# Patient Record
Sex: FEMALE | Race: WHITE | NOT HISPANIC OR LATINO | Employment: UNEMPLOYED | URBAN - METROPOLITAN AREA
[De-identification: names, ages, dates, MRNs, and addresses within clinical notes are randomized per-mention and may not be internally consistent; named-entity substitution may affect disease eponyms.]

---

## 2017-05-16 ENCOUNTER — TELEPHONE (OUTPATIENT)
Dept: OBSTETRICS AND GYNECOLOGY | Facility: CLINIC | Age: 27
End: 2017-05-16

## 2017-05-16 NOTE — TELEPHONE ENCOUNTER
Patient stated her LMP 4/15/17. She stated she had a positive pregnancy test. I offered patient an appointment on 5/25 at 1:40. Patient scheduled appointment.

## 2017-05-16 NOTE — TELEPHONE ENCOUNTER
----- Message from Horace Andrea sent at 5/16/2017 12:56 PM CDT -----  Contact: ,samina  Patient need to schedule appointment for first time pregnancy please call back at  [phone]

## 2017-05-25 ENCOUNTER — OFFICE VISIT (OUTPATIENT)
Dept: OBSTETRICS AND GYNECOLOGY | Facility: CLINIC | Age: 27
End: 2017-05-25
Payer: COMMERCIAL

## 2017-05-25 ENCOUNTER — LAB VISIT (OUTPATIENT)
Dept: LAB | Facility: HOSPITAL | Age: 27
End: 2017-05-25
Attending: OBSTETRICS & GYNECOLOGY
Payer: COMMERCIAL

## 2017-05-25 VITALS
BODY MASS INDEX: 26.29 KG/M2 | DIASTOLIC BLOOD PRESSURE: 84 MMHG | HEART RATE: 93 BPM | WEIGHT: 154 LBS | SYSTOLIC BLOOD PRESSURE: 128 MMHG | HEIGHT: 64 IN

## 2017-05-25 DIAGNOSIS — Z32.01 POSITIVE PREGNANCY TEST: ICD-10-CM

## 2017-05-25 DIAGNOSIS — N91.2 ABSENT MENSES: Primary | ICD-10-CM

## 2017-05-25 LAB
ABO + RH BLD: NORMAL
B-HCG UR QL: POSITIVE
BASOPHILS # BLD AUTO: 0 K/UL
BASOPHILS NFR BLD: 0.2 %
BILIRUB SERPL-MCNC: NEGATIVE MG/DL
BLD GP AB SCN CELLS X3 SERPL QL: NORMAL
BLOOD URINE, POC: NEGATIVE
COLOR, POC UA: YELLOW
CTP QC/QA: YES
DIFFERENTIAL METHOD: ABNORMAL
EOSINOPHIL # BLD AUTO: 0.2 K/UL
EOSINOPHIL NFR BLD: 1.5 %
ERYTHROCYTE [DISTWIDTH] IN BLOOD BY AUTOMATED COUNT: 14.3 %
GLUCOSE UR QL STRIP: NORMAL
HCT VFR BLD AUTO: 37.8 %
HGB BLD-MCNC: 12.6 G/DL
KETONES UR QL STRIP: NEGATIVE
LEUKOCYTE ESTERASE URINE, POC: NEGATIVE
LYMPHOCYTES # BLD AUTO: 2.4 K/UL
LYMPHOCYTES NFR BLD: 19.8 %
MCH RBC QN AUTO: 27.8 PG
MCHC RBC AUTO-ENTMCNC: 33.2 %
MCV RBC AUTO: 84 FL
MONOCYTES # BLD AUTO: 0.7 K/UL
MONOCYTES NFR BLD: 5.8 %
NEUTROPHILS # BLD AUTO: 8.9 K/UL
NEUTROPHILS NFR BLD: 72.7 %
NITRITE, POC UA: NEGATIVE
PH, POC UA: 6
PLATELET # BLD AUTO: 328 K/UL
PMV BLD AUTO: 9.2 FL
PROTEIN, POC: NEGATIVE
RBC # BLD AUTO: 4.51 M/UL
SPECIFIC GRAVITY, POC UA: 1.01
T4 FREE SERPL-MCNC: 0.93 NG/DL
TSH SERPL DL<=0.005 MIU/L-ACNC: 0.05 UIU/ML
UROBILINOGEN, POC UA: NORMAL
WBC # BLD AUTO: 12.2 K/UL

## 2017-05-25 PROCEDURE — 83020 HEMOGLOBIN ELECTROPHORESIS: CPT

## 2017-05-25 PROCEDURE — 83021 HEMOGLOBIN CHROMOTOGRAPHY: CPT

## 2017-05-25 PROCEDURE — 87086 URINE CULTURE/COLONY COUNT: CPT

## 2017-05-25 PROCEDURE — 86850 RBC ANTIBODY SCREEN: CPT

## 2017-05-25 PROCEDURE — 36415 COLL VENOUS BLD VENIPUNCTURE: CPT

## 2017-05-25 PROCEDURE — 99213 OFFICE O/P EST LOW 20 MIN: CPT | Mod: 25,S$GLB,, | Performed by: OBSTETRICS & GYNECOLOGY

## 2017-05-25 PROCEDURE — 81220 CFTR GENE COM VARIANTS: CPT

## 2017-05-25 PROCEDURE — 81002 URINALYSIS NONAUTO W/O SCOPE: CPT | Mod: S$GLB,,, | Performed by: OBSTETRICS & GYNECOLOGY

## 2017-05-25 PROCEDURE — 87340 HEPATITIS B SURFACE AG IA: CPT

## 2017-05-25 PROCEDURE — 85025 COMPLETE CBC W/AUTO DIFF WBC: CPT

## 2017-05-25 PROCEDURE — 86703 HIV-1/HIV-2 1 RESULT ANTBDY: CPT

## 2017-05-25 PROCEDURE — 86762 RUBELLA ANTIBODY: CPT

## 2017-05-25 PROCEDURE — 86900 BLOOD TYPING SEROLOGIC ABO: CPT

## 2017-05-25 PROCEDURE — 99999 PR PBB SHADOW E&M-EST. PATIENT-LVL II: CPT | Mod: PBBFAC,,, | Performed by: OBSTETRICS & GYNECOLOGY

## 2017-05-25 PROCEDURE — 81025 URINE PREGNANCY TEST: CPT | Mod: QW,S$GLB,, | Performed by: OBSTETRICS & GYNECOLOGY

## 2017-05-25 PROCEDURE — 87591 N.GONORRHOEAE DNA AMP PROB: CPT

## 2017-05-25 PROCEDURE — 84439 ASSAY OF FREE THYROXINE: CPT

## 2017-05-25 PROCEDURE — 86592 SYPHILIS TEST NON-TREP QUAL: CPT

## 2017-05-25 PROCEDURE — 84443 ASSAY THYROID STIM HORMONE: CPT

## 2017-05-25 RX ORDER — FOLIC ACID 1 MG/1
1 TABLET ORAL DAILY
COMMUNITY

## 2017-05-26 LAB
BACTERIA UR CULT: NO GROWTH
C TRACH DNA SPEC QL NAA+PROBE: NOT DETECTED
HBV SURFACE AG SERPL QL IA: NEGATIVE
HGB A2 MFR BLD HPLC: 2.8 %
HGB FRACT BLD ELPH-IMP: NORMAL
HGB FRACT BLD ELPH-IMP: NORMAL
HIV 1+2 AB+HIV1 P24 AG SERPL QL IA: NEGATIVE
N GONORRHOEA DNA SPEC QL NAA+PROBE: NOT DETECTED
RPR SER QL: NORMAL
RUBV IGG SER-ACNC: 14.6 IU/ML
RUBV IGG SER-IMP: REACTIVE

## 2017-05-28 NOTE — PROGRESS NOTES
Subjective:       Patient ID: Sara Arellano is a 26 y.o. female.    Chief Complaint:  Possible Pregnancy      History of Present Illness  HPI  Missed Menses/ Possible Pregnancy  Patient complains of amenorrhea. She believes she could be pregnant. Pregnancy is desired. Sexual Activity: single partner, contraception: none. Current symptoms also include: positive home pregnancy test. Last period was normal.     Patient's last menstrual period was 2017 (approximate).                     GYN & OB History  Patient's last menstrual period was 2017 (approximate).   Date of Last Pap: 3/29/2016    OB History    Para Term  AB Living   1 1 1 0 0 1   SAB TAB Ectopic Multiple Live Births   0 0 0 0 1      # Outcome Date GA Lbr Roman/2nd Weight Sex Delivery Anes PTL Lv   1 Term 16 38w4d / 01:20 3.12 kg (6 lb 14.1 oz) F Vag-Spont EPI N FLORENTINO          Review of Systems  Review of Systems   Constitutional: Negative.    Respiratory: Negative.    Cardiovascular: Negative.    Gastrointestinal: Negative.    Genitourinary: Negative.    Musculoskeletal: Negative.    Skin:  Negative.   Neurological: Negative.    Psychiatric/Behavioral: Negative.            Objective:    Physical Exam:   Constitutional: She is oriented to person, place, and time. She appears well-developed and well-nourished.    HENT:   Head: Normocephalic and atraumatic.    Eyes: EOM are normal.    Neck: Normal range of motion.    Cardiovascular: Normal rate.     Pulmonary/Chest: Effort normal.                  Musculoskeletal: Normal range of motion and moves all extremeties.       Neurological: She is alert and oriented to person, place, and time.    Skin: Skin is warm and dry.    Psychiatric: She has a normal mood and affect. Her behavior is normal. Judgment and thought content normal.          Assessment:        1. Absent menses    2. Positive pregnancy test                Plan:      Labs ordered  Prenatal vitamins  Discussed first  trimester expectations, restrictions  [facility]

## 2017-05-31 LAB — CFTR MUT ANL BLD/T: NORMAL

## 2017-06-09 ENCOUNTER — ROUTINE PRENATAL (OUTPATIENT)
Dept: OBSTETRICS AND GYNECOLOGY | Facility: CLINIC | Age: 27
End: 2017-06-09
Payer: COMMERCIAL

## 2017-06-09 VITALS
DIASTOLIC BLOOD PRESSURE: 70 MMHG | HEART RATE: 75 BPM | WEIGHT: 152.56 LBS | BODY MASS INDEX: 26.19 KG/M2 | SYSTOLIC BLOOD PRESSURE: 114 MMHG

## 2017-06-09 DIAGNOSIS — Z36.87 UNSURE OF LMP (LAST MENSTRUAL PERIOD) AS REASON FOR ULTRASOUND SCAN: ICD-10-CM

## 2017-06-09 DIAGNOSIS — Z34.81 ENCOUNTER FOR SUPERVISION OF OTHER NORMAL PREGNANCY, FIRST TRIMESTER: Primary | ICD-10-CM

## 2017-06-09 DIAGNOSIS — Z3A.11 11 WEEKS GESTATION OF PREGNANCY: ICD-10-CM

## 2017-06-09 PROCEDURE — 99999 PR PBB SHADOW E&M-EST. PATIENT-LVL II: CPT | Mod: PBBFAC,,, | Performed by: OBSTETRICS & GYNECOLOGY

## 2017-06-09 PROCEDURE — 76817 TRANSVAGINAL US OBSTETRIC: CPT | Mod: S$GLB,,, | Performed by: OBSTETRICS & GYNECOLOGY

## 2017-06-09 PROCEDURE — 0500F INITIAL PRENATAL CARE VISIT: CPT | Mod: S$GLB,,, | Performed by: OBSTETRICS & GYNECOLOGY

## 2017-06-09 RX ORDER — ONDANSETRON 4 MG/1
4 TABLET, FILM COATED ORAL EVERY 6 HOURS PRN
Qty: 30 TABLET | Refills: 1 | Status: SHIPPED | OUTPATIENT
Start: 2017-06-09 | End: 2017-09-01

## 2017-06-09 NOTE — PROGRESS NOTES
Transvaginal ultrasound showed IUP @ 11w0d with cardiac activity, no masses, no free fluid, normal adnexa and uterus

## 2017-06-12 ENCOUNTER — TELEPHONE (OUTPATIENT)
Dept: OBSTETRICS AND GYNECOLOGY | Facility: CLINIC | Age: 27
End: 2017-06-12

## 2017-07-07 ENCOUNTER — ROUTINE PRENATAL (OUTPATIENT)
Dept: OBSTETRICS AND GYNECOLOGY | Facility: CLINIC | Age: 27
End: 2017-07-07
Payer: COMMERCIAL

## 2017-07-07 ENCOUNTER — LAB VISIT (OUTPATIENT)
Dept: LAB | Facility: HOSPITAL | Age: 27
End: 2017-07-07
Attending: OBSTETRICS & GYNECOLOGY
Payer: COMMERCIAL

## 2017-07-07 VITALS
WEIGHT: 155.13 LBS | HEART RATE: 90 BPM | SYSTOLIC BLOOD PRESSURE: 109 MMHG | DIASTOLIC BLOOD PRESSURE: 72 MMHG | BODY MASS INDEX: 26.62 KG/M2

## 2017-07-07 DIAGNOSIS — Z34.82 ENCOUNTER FOR SUPERVISION OF OTHER NORMAL PREGNANCY, SECOND TRIMESTER: Primary | ICD-10-CM

## 2017-07-07 DIAGNOSIS — Z34.82 ENCOUNTER FOR SUPERVISION OF OTHER NORMAL PREGNANCY, SECOND TRIMESTER: ICD-10-CM

## 2017-07-07 DIAGNOSIS — Z3A.15 15 WEEKS GESTATION OF PREGNANCY: ICD-10-CM

## 2017-07-07 DIAGNOSIS — Z01.419 WELL WOMAN EXAM WITH ROUTINE GYNECOLOGICAL EXAM: ICD-10-CM

## 2017-07-07 PROCEDURE — 81511 FTL CGEN ABNOR FOUR ANAL: CPT

## 2017-07-07 PROCEDURE — 99999 PR PBB SHADOW E&M-EST. PATIENT-LVL III: CPT | Mod: PBBFAC,,, | Performed by: OBSTETRICS & GYNECOLOGY

## 2017-07-07 PROCEDURE — 0502F SUBSEQUENT PRENATAL CARE: CPT | Mod: S$GLB,,, | Performed by: OBSTETRICS & GYNECOLOGY

## 2017-07-07 PROCEDURE — 88175 CYTOPATH C/V AUTO FLUID REDO: CPT

## 2017-07-07 PROCEDURE — 36415 COLL VENOUS BLD VENIPUNCTURE: CPT

## 2017-07-11 LAB
# FETUSES US: NORMAL
2ND TRIMESTER 4 SCREEN PNL SERPL: NEGATIVE
2ND TRIMESTER 4 SCREEN SERPL-IMP: NORMAL
AFP MOM SERPL: 0.45
AFP SERPL-MCNC: 13.3 NG/ML
AGE AT DELIVERY: 26
B-HCG MOM SERPL: 0.7
B-HCG SERPL-ACNC: 27 IU/ML
FET TS 21 RISK FROM MAT AGE: NORMAL
GA (DAYS): 0 D
GA (WEEKS): 15 WK
GA METHOD: NORMAL
IDDM PATIENT QL: NORMAL
INHIBIN A MOM SERPL: 0.76
INHIBIN A SERPL-MCNC: 184 PG/ML
TS 18 RISK FETUS: NORMAL
TS 21 RISK FETUS: NORMAL
U ESTRIOL MOM SERPL: 1.09
U ESTRIOL SERPL-MCNC: 0.65 NG/ML

## 2017-07-28 ENCOUNTER — HOSPITAL ENCOUNTER (OUTPATIENT)
Dept: RADIOLOGY | Facility: HOSPITAL | Age: 27
Discharge: HOME | End: 2017-07-28
Attending: OBSTETRICS & GYNECOLOGY
Payer: COMMERCIAL

## 2017-07-28 DIAGNOSIS — Z3A.15 15 WEEKS GESTATION OF PREGNANCY: ICD-10-CM

## 2017-07-28 PROCEDURE — 76805 OB US >/= 14 WKS SNGL FETUS: CPT | Mod: 26,,, | Performed by: RADIOLOGY

## 2017-07-28 PROCEDURE — 76805 OB US >/= 14 WKS SNGL FETUS: CPT | Mod: TC

## 2017-08-04 ENCOUNTER — ROUTINE PRENATAL (OUTPATIENT)
Dept: OBSTETRICS AND GYNECOLOGY | Facility: CLINIC | Age: 27
End: 2017-08-04
Payer: COMMERCIAL

## 2017-08-04 VITALS
HEART RATE: 85 BPM | WEIGHT: 160.25 LBS | DIASTOLIC BLOOD PRESSURE: 70 MMHG | SYSTOLIC BLOOD PRESSURE: 123 MMHG | BODY MASS INDEX: 27.51 KG/M2

## 2017-08-04 DIAGNOSIS — Z3A.19 19 WEEKS GESTATION OF PREGNANCY: ICD-10-CM

## 2017-08-04 DIAGNOSIS — Z34.82 ENCOUNTER FOR SUPERVISION OF OTHER NORMAL PREGNANCY, SECOND TRIMESTER: Primary | ICD-10-CM

## 2017-08-04 PROCEDURE — 99999 PR PBB SHADOW E&M-EST. PATIENT-LVL II: CPT | Mod: PBBFAC,,, | Performed by: OBSTETRICS & GYNECOLOGY

## 2017-08-04 PROCEDURE — 0502F SUBSEQUENT PRENATAL CARE: CPT | Mod: S$GLB,,, | Performed by: OBSTETRICS & GYNECOLOGY

## 2017-09-01 ENCOUNTER — LAB VISIT (OUTPATIENT)
Dept: LAB | Facility: HOSPITAL | Age: 27
End: 2017-09-01
Attending: OBSTETRICS & GYNECOLOGY
Payer: COMMERCIAL

## 2017-09-01 ENCOUNTER — ROUTINE PRENATAL (OUTPATIENT)
Dept: OBSTETRICS AND GYNECOLOGY | Facility: CLINIC | Age: 27
End: 2017-09-01
Payer: COMMERCIAL

## 2017-09-01 VITALS
BODY MASS INDEX: 27.61 KG/M2 | DIASTOLIC BLOOD PRESSURE: 66 MMHG | HEART RATE: 79 BPM | WEIGHT: 160.81 LBS | SYSTOLIC BLOOD PRESSURE: 119 MMHG

## 2017-09-01 DIAGNOSIS — Z3A.23 23 WEEKS GESTATION OF PREGNANCY: Primary | ICD-10-CM

## 2017-09-01 DIAGNOSIS — Z34.82 ENCOUNTER FOR SUPERVISION OF OTHER NORMAL PREGNANCY, SECOND TRIMESTER: ICD-10-CM

## 2017-09-01 DIAGNOSIS — Z3A.23 23 WEEKS GESTATION OF PREGNANCY: ICD-10-CM

## 2017-09-01 LAB — GLUCOSE SERPL-MCNC: 104 MG/DL

## 2017-09-01 PROCEDURE — 99999 PR PBB SHADOW E&M-EST. PATIENT-LVL II: CPT | Mod: PBBFAC,,, | Performed by: OBSTETRICS & GYNECOLOGY

## 2017-09-01 PROCEDURE — 36415 COLL VENOUS BLD VENIPUNCTURE: CPT

## 2017-09-01 PROCEDURE — 82950 GLUCOSE TEST: CPT

## 2017-09-01 PROCEDURE — 0502F SUBSEQUENT PRENATAL CARE: CPT | Mod: S$GLB,,, | Performed by: OBSTETRICS & GYNECOLOGY

## 2017-10-04 ENCOUNTER — TELEPHONE (OUTPATIENT)
Dept: OBSTETRICS AND GYNECOLOGY | Facility: CLINIC | Age: 27
End: 2017-10-04

## 2017-11-21 ENCOUNTER — TELEPHONE (OUTPATIENT)
Dept: OBSTETRICS AND GYNECOLOGY | Facility: CLINIC | Age: 27
End: 2017-11-21

## 2017-11-21 NOTE — TELEPHONE ENCOUNTER
----- Message from Bebe Mario sent at 11/21/2017 12:16 PM CST -----  Contact: pt  x_  1st Request  _x  2nd Request  x_  3rd Request      Who:pt    Why: pt states that she is waiting for a call back regarding an appointment, pt is 35 weeks OB pt is transferring care, pt is upset!!!!    What Number to Call Back: [phone]    When to Expect a call back: (Before the end of the day)   -- if call after 3:00 call back will be tomorrow.

## 2017-11-22 ENCOUNTER — TELEPHONE (OUTPATIENT)
Dept: OBSTETRICS AND GYNECOLOGY | Facility: CLINIC | Age: 27
End: 2017-11-22

## 2017-11-22 NOTE — TELEPHONE ENCOUNTER
----- Message from Kizzy Rees sent at 11/22/2017 10:45 AM CST -----  _X  1st Request  _  2nd Request  _  3rd Request        Who: DENIS RUBIO [[id number]]    Why: Requesting a call back in regards to her appt on Friday. Pt would like to discuss appt details. Please call.    What Number to Call Back:[phone]    When to Expect a call back: (Within 24 hours)    Please return the call at earliest convenience. Thanks!

## 2017-11-24 ENCOUNTER — TELEPHONE (OUTPATIENT)
Dept: OBSTETRICS AND GYNECOLOGY | Facility: CLINIC | Age: 27
End: 2017-11-24

## 2017-11-24 ENCOUNTER — INITIAL PRENATAL (OUTPATIENT)
Dept: OBSTETRICS AND GYNECOLOGY | Facility: CLINIC | Age: 27
End: 2017-11-24
Payer: MEDICAID

## 2017-11-24 ENCOUNTER — LAB VISIT (OUTPATIENT)
Dept: LAB | Facility: OTHER | Age: 27
End: 2017-11-24
Attending: OBSTETRICS & GYNECOLOGY
Payer: MEDICAID

## 2017-11-24 VITALS
DIASTOLIC BLOOD PRESSURE: 66 MMHG | BODY MASS INDEX: 29.21 KG/M2 | WEIGHT: 170.19 LBS | SYSTOLIC BLOOD PRESSURE: 116 MMHG

## 2017-11-24 DIAGNOSIS — K08.89 TOOTH PAIN: ICD-10-CM

## 2017-11-24 DIAGNOSIS — Z3A.35 35 WEEKS GESTATION OF PREGNANCY: Primary | ICD-10-CM

## 2017-11-24 DIAGNOSIS — Z3A.35 35 WEEKS GESTATION OF PREGNANCY: ICD-10-CM

## 2017-11-24 LAB
ANION GAP SERPL CALC-SCNC: 7 MMOL/L
BASOPHILS # BLD AUTO: 0.02 K/UL
BASOPHILS NFR BLD: 0.2 %
BUN SERPL-MCNC: 12 MG/DL
CALCIUM SERPL-MCNC: 8.8 MG/DL
CHLORIDE SERPL-SCNC: 105 MMOL/L
CO2 SERPL-SCNC: 24 MMOL/L
CREAT SERPL-MCNC: 0.6 MG/DL
DIFFERENTIAL METHOD: ABNORMAL
EOSINOPHIL # BLD AUTO: 0.2 K/UL
EOSINOPHIL NFR BLD: 2.2 %
ERYTHROCYTE [DISTWIDTH] IN BLOOD BY AUTOMATED COUNT: 14 %
EST. GFR  (AFRICAN AMERICAN): >60 ML/MIN/1.73 M^2
EST. GFR  (NON AFRICAN AMERICAN): >60 ML/MIN/1.73 M^2
GLUCOSE SERPL-MCNC: 74 MG/DL
HCT VFR BLD AUTO: 34.8 %
HGB BLD-MCNC: 11 G/DL
LYMPHOCYTES # BLD AUTO: 2.3 K/UL
LYMPHOCYTES NFR BLD: 22.6 %
MAGNESIUM SERPL-MCNC: 1.6 MG/DL
MCH RBC QN AUTO: 29 PG
MCHC RBC AUTO-ENTMCNC: 31.6 G/DL
MCV RBC AUTO: 92 FL
MONOCYTES # BLD AUTO: 0.9 K/UL
MONOCYTES NFR BLD: 8.9 %
NEUTROPHILS # BLD AUTO: 6.6 K/UL
NEUTROPHILS NFR BLD: 65.6 %
PLATELET # BLD AUTO: 320 K/UL
PMV BLD AUTO: 9.5 FL
POTASSIUM SERPL-SCNC: 3.9 MMOL/L
RBC # BLD AUTO: 3.79 M/UL
RPR SER QL: NORMAL
SODIUM SERPL-SCNC: 136 MMOL/L
WBC # BLD AUTO: 10.12 K/UL

## 2017-11-24 PROCEDURE — 86592 SYPHILIS TEST NON-TREP QUAL: CPT

## 2017-11-24 PROCEDURE — 80048 BASIC METABOLIC PNL TOTAL CA: CPT

## 2017-11-24 PROCEDURE — 87184 SC STD DISK METHOD PER PLATE: CPT

## 2017-11-24 PROCEDURE — 85025 COMPLETE CBC W/AUTO DIFF WBC: CPT

## 2017-11-24 PROCEDURE — 99213 OFFICE O/P EST LOW 20 MIN: CPT | Mod: PBBFAC | Performed by: OBSTETRICS & GYNECOLOGY

## 2017-11-24 PROCEDURE — 83735 ASSAY OF MAGNESIUM: CPT

## 2017-11-24 PROCEDURE — 86703 HIV-1/HIV-2 1 RESULT ANTBDY: CPT

## 2017-11-24 PROCEDURE — 87147 CULTURE TYPE IMMUNOLOGIC: CPT

## 2017-11-24 PROCEDURE — 99214 OFFICE O/P EST MOD 30 MIN: CPT | Mod: TH,S$PBB,, | Performed by: OBSTETRICS & GYNECOLOGY

## 2017-11-24 PROCEDURE — 99999 PR PBB SHADOW E&M-EST. PATIENT-LVL III: CPT | Mod: PBBFAC,,, | Performed by: OBSTETRICS & GYNECOLOGY

## 2017-11-24 PROCEDURE — 87081 CULTURE SCREEN ONLY: CPT

## 2017-11-24 PROCEDURE — 36415 COLL VENOUS BLD VENIPUNCTURE: CPT

## 2017-11-24 RX ORDER — ACETAMINOPHEN AND CODEINE PHOSPHATE 300; 30 MG/1; MG/1
1-2 TABLET ORAL EVERY 4 HOURS PRN
Qty: 20 TABLET | Refills: 0 | Status: SHIPPED | OUTPATIENT
Start: 2017-11-24 | End: 2017-12-05

## 2017-11-24 NOTE — PROGRESS NOTES
Transfer of care at 35 weeks from Dr. Cool. Short interpregnancy interval (had  in 2016). She was on the minipill and did not have a cycle and got pregnant. Initial U/S was at 11 wga. Anatomy U/S at 17 wga, normal. Labs have been normal. Has not been seen since 23 weeks, states that she has been trying to get an appt here but was unsuccessful. Notes are not reflective of this in the chart.   Good FM. Denies VB, LOF, CTX. Labor, ROM and bleeding precautions.   Having leg cramps, will check electrolytes. Has a bad tooth ache (broken wisdom tooth) and her dentist wants to wait to pull it until after delivery. Has been taking ibuprofen around the clock for the past 2 months, recommended to discontinue NSAID to prevent premature closure of the ductus arteriosis. Rx for Tylenol #3 given, discussed taking this sparingly.   Fundal height 32 cm, fetus feels vertex on cervical exam, but may have an unstable lie. Will send to [facility] for growth scan. Briefly discussed ECV if indicated and patient would be interested.   Patient wants a tubal, briefly discussed regret at her age. Will discuss further at future visits. Needs medicaid tubal consents if this method is chosen.

## 2017-11-27 LAB — HIV 1+2 AB+HIV1 P24 AG SERPL QL IA: NEGATIVE

## 2017-11-28 LAB — BACTERIA SPEC AEROBE CULT: NORMAL

## 2017-11-30 ENCOUNTER — OFFICE VISIT (OUTPATIENT)
Dept: MATERNAL FETAL MEDICINE | Facility: CLINIC | Age: 27
End: 2017-11-30
Attending: OBSTETRICS & GYNECOLOGY
Payer: MEDICAID

## 2017-11-30 ENCOUNTER — ROUTINE PRENATAL (OUTPATIENT)
Dept: OBSTETRICS AND GYNECOLOGY | Facility: CLINIC | Age: 27
End: 2017-11-30
Payer: MEDICAID

## 2017-11-30 VITALS — BODY MASS INDEX: 28.49 KG/M2 | WEIGHT: 166 LBS | DIASTOLIC BLOOD PRESSURE: 68 MMHG | SYSTOLIC BLOOD PRESSURE: 118 MMHG

## 2017-11-30 DIAGNOSIS — Z36.89 ENCOUNTER FOR FETAL ANATOMIC SURVEY: ICD-10-CM

## 2017-11-30 DIAGNOSIS — Z3A.35 35 WEEKS GESTATION OF PREGNANCY: ICD-10-CM

## 2017-11-30 DIAGNOSIS — Z3A.35 35 WEEKS GESTATION OF PREGNANCY: Primary | ICD-10-CM

## 2017-11-30 PROCEDURE — 76805 OB US >/= 14 WKS SNGL FETUS: CPT | Mod: 26,S$PBB,, | Performed by: OBSTETRICS & GYNECOLOGY

## 2017-11-30 PROCEDURE — 99212 OFFICE O/P EST SF 10 MIN: CPT | Mod: PBBFAC | Performed by: OBSTETRICS & GYNECOLOGY

## 2017-11-30 PROCEDURE — 99999 PR PBB SHADOW E&M-EST. PATIENT-LVL II: CPT | Mod: PBBFAC,,, | Performed by: OBSTETRICS & GYNECOLOGY

## 2017-11-30 PROCEDURE — 76805 OB US >/= 14 WKS SNGL FETUS: CPT | Mod: PBBFAC | Performed by: OBSTETRICS & GYNECOLOGY

## 2017-11-30 PROCEDURE — 99213 OFFICE O/P EST LOW 20 MIN: CPT | Mod: TH,S$PBB,, | Performed by: OBSTETRICS & GYNECOLOGY

## 2017-11-30 PROCEDURE — 99499 UNLISTED E&M SERVICE: CPT | Mod: S$PBB,,, | Performed by: OBSTETRICS & GYNECOLOGY

## 2017-11-30 NOTE — PROGRESS NOTES
Good FM. Denies VB, LOF, CTX. Labor, ROM and bleeding precautions. Growth scan today due to S<D. Flu/Tdap today.

## 2017-11-30 NOTE — LETTER
November 30, 2017      Babs Crcokett MD  [address] [facility] [address]           [facility]  [address]  [facility] [address]  Phone: [phone]          Patient: Sara Arellano   MR Number: [id number]   YOB: 1990   Date of Visit: 11/30/2017       Dear Dr. Babs Crockett:    Thank you for referring Sara Arellano to me for evaluation. Attached you will find relevant portions of my assessment and plan of care.    If you have questions, please do not hesitate to call me. I look forward to following Sara Arellano along with you.    Sincerely,    Codey Courtney MD    Enclosure  CC:  No Recipients    If you would like to receive this communication electronically, please contact [email or web address] or [phone] to request more information on [email or web address] Link access.    For providers and/or their staff who would like to refer a patient to [facility], please contact us through our one-stop-shop provider referral line, [facility], at [phone].    If you feel you have received this communication in error or would no longer like to receive these types of communications, please e-mail [email or web address]

## 2017-12-01 NOTE — PROGRESS NOTES
Obstetrical ultrasound completed today.  See report in imaging section of [facility].

## 2017-12-05 ENCOUNTER — ROUTINE PRENATAL (OUTPATIENT)
Dept: OBSTETRICS AND GYNECOLOGY | Facility: CLINIC | Age: 27
End: 2017-12-05
Payer: MEDICAID

## 2017-12-05 VITALS
WEIGHT: 167.56 LBS | SYSTOLIC BLOOD PRESSURE: 118 MMHG | BODY MASS INDEX: 28.76 KG/M2 | DIASTOLIC BLOOD PRESSURE: 78 MMHG

## 2017-12-05 DIAGNOSIS — Z3A.36 36 WEEKS GESTATION OF PREGNANCY: Primary | ICD-10-CM

## 2017-12-05 DIAGNOSIS — Z23 NEED FOR TDAP VACCINATION: ICD-10-CM

## 2017-12-05 PROCEDURE — 99212 OFFICE O/P EST SF 10 MIN: CPT | Mod: TH,S$PBB,, | Performed by: NURSE PRACTITIONER

## 2017-12-05 PROCEDURE — 99999 PR PBB SHADOW E&M-EST. PATIENT-LVL III: CPT | Mod: PBBFAC,,, | Performed by: NURSE PRACTITIONER

## 2017-12-05 PROCEDURE — 99213 OFFICE O/P EST LOW 20 MIN: CPT | Mod: PBBFAC | Performed by: NURSE PRACTITIONER

## 2017-12-05 NOTE — LETTER
December 5, 2017    Sara Arellano  [address]  [address]  [place] [address]              [facility] [address]  [address] [facility] [address]  [facility] [address]  Phone: [phone]  Fax: [phone]    To Whom It May Concern:    Ms. Arellano is currently under our care for pregnancy. She was seen in the clinic today. Please call with any questions or concerns.   Estimated Date of Delivery: 12/29/17        Sincerely,    Brittanie Carballo, NP

## 2017-12-05 NOTE — PROGRESS NOTES
Here for routine OB appt at 36w4d, with no complaints. Here today with  and daughter. Reports good FM.  Denies LOF, denies VB, denies contractions. Infant vertex from MFM scan. Reviewed GBS + results. Hx rapid labor with first daughter-states she has high pain tolerance and never felt any ctx.  Reviewed warning signs of Labor and Preeclampsia.  Daily FM counts reinforced.  F/U scheduled 1 week  Tdap scheduled

## 2017-12-08 ENCOUNTER — ANESTHESIA EVENT (OUTPATIENT)
Dept: OBSTETRICS AND GYNECOLOGY | Facility: OTHER | Age: 27
End: 2017-12-08
Payer: MEDICAID

## 2017-12-08 ENCOUNTER — ANESTHESIA (OUTPATIENT)
Dept: OBSTETRICS AND GYNECOLOGY | Facility: OTHER | Age: 27
End: 2017-12-08
Payer: MEDICAID

## 2017-12-08 ENCOUNTER — HOSPITAL ENCOUNTER (INPATIENT)
Facility: OTHER | Age: 27
LOS: 3 days | Discharge: HOME | End: 2017-12-11
Attending: OBSTETRICS & GYNECOLOGY | Admitting: OBSTETRICS & GYNECOLOGY
Payer: MEDICAID

## 2017-12-08 DIAGNOSIS — O41.03X0 OLIGOHYDRAMNIOS ANTEPARTUM, THIRD TRIMESTER, NOT APPLICABLE OR UNSPECIFIED FETUS: Primary | ICD-10-CM

## 2017-12-08 DIAGNOSIS — Z3A.37 37 WEEKS GESTATION OF PREGNANCY: ICD-10-CM

## 2017-12-08 DIAGNOSIS — O47.9: ICD-10-CM

## 2017-12-08 DIAGNOSIS — O41.03X0 OLIGOHYDRAMNIOS IN THIRD TRIMESTER: ICD-10-CM

## 2017-12-08 PROBLEM — O98.819 GROUP B STREPTOCOCCAL INFECTION DURING PREGNANCY: Status: ACTIVE | Noted: 2017-12-08

## 2017-12-08 PROBLEM — B95.1 GROUP B STREPTOCOCCAL INFECTION DURING PREGNANCY: Status: ACTIVE | Noted: 2017-12-08

## 2017-12-08 LAB
ABO + RH BLD: NORMAL
BASOPHILS # BLD AUTO: 0.03 K/UL
BASOPHILS NFR BLD: 0.3 %
BLD GP AB SCN CELLS X3 SERPL QL: NORMAL
DIFFERENTIAL METHOD: ABNORMAL
EOSINOPHIL # BLD AUTO: 0.2 K/UL
EOSINOPHIL NFR BLD: 1.9 %
ERYTHROCYTE [DISTWIDTH] IN BLOOD BY AUTOMATED COUNT: 13.7 %
HCT VFR BLD AUTO: 33.8 %
HGB BLD-MCNC: 10.8 G/DL
LYMPHOCYTES # BLD AUTO: 2.7 K/UL
LYMPHOCYTES NFR BLD: 25.3 %
MCH RBC QN AUTO: 28.9 PG
MCHC RBC AUTO-ENTMCNC: 32 G/DL
MCV RBC AUTO: 90 FL
MONOCYTES # BLD AUTO: 1 K/UL
MONOCYTES NFR BLD: 9.2 %
NEUTROPHILS # BLD AUTO: 6.8 K/UL
NEUTROPHILS NFR BLD: 62.8 %
PLATELET # BLD AUTO: 338 K/UL
PMV BLD AUTO: 10.3 FL
RBC # BLD AUTO: 3.74 M/UL
RUPTURE OF MEMBRANE: NEGATIVE
WBC # BLD AUTO: 10.78 K/UL

## 2017-12-08 PROCEDURE — 99285 EMERGENCY DEPT VISIT HI MDM: CPT | Mod: 25

## 2017-12-08 PROCEDURE — 36415 COLL VENOUS BLD VENIPUNCTURE: CPT

## 2017-12-08 PROCEDURE — 25000003 PHARM REV CODE 250: Performed by: STUDENT IN AN ORGANIZED HEALTH CARE EDUCATION/TRAINING PROGRAM

## 2017-12-08 PROCEDURE — 72100002 HC LABOR CARE, 1ST 8 HOURS

## 2017-12-08 PROCEDURE — 86901 BLOOD TYPING SEROLOGIC RH(D): CPT

## 2017-12-08 PROCEDURE — 84112 EVAL AMNIOTIC FLUID PROTEIN: CPT

## 2017-12-08 PROCEDURE — 99284 EMERGENCY DEPT VISIT MOD MDM: CPT | Mod: 25,,, | Performed by: OBSTETRICS & GYNECOLOGY

## 2017-12-08 PROCEDURE — 85025 COMPLETE CBC W/AUTO DIFF WBC: CPT

## 2017-12-08 PROCEDURE — 25000003 PHARM REV CODE 250: Performed by: OBSTETRICS & GYNECOLOGY

## 2017-12-08 PROCEDURE — 59025 FETAL NON-STRESS TEST: CPT

## 2017-12-08 PROCEDURE — 11000001 HC ACUTE MED/SURG PRIVATE ROOM

## 2017-12-08 PROCEDURE — 86900 BLOOD TYPING SEROLOGIC ABO: CPT

## 2017-12-08 PROCEDURE — 63600175 PHARM REV CODE 636 W HCPCS: Performed by: STUDENT IN AN ORGANIZED HEALTH CARE EDUCATION/TRAINING PROGRAM

## 2017-12-08 PROCEDURE — 59025 FETAL NON-STRESS TEST: CPT | Mod: 26,,, | Performed by: OBSTETRICS & GYNECOLOGY

## 2017-12-08 RX ORDER — ONDANSETRON 8 MG/1
8 TABLET, ORALLY DISINTEGRATING ORAL EVERY 8 HOURS PRN
Status: DISCONTINUED | OUTPATIENT
Start: 2017-12-08 | End: 2017-12-09

## 2017-12-08 RX ORDER — MISOPROSTOL 200 UG/1
600 TABLET ORAL
Status: DISCONTINUED | OUTPATIENT
Start: 2017-12-08 | End: 2017-12-09

## 2017-12-08 RX ORDER — SODIUM CHLORIDE, SODIUM LACTATE, POTASSIUM CHLORIDE, CALCIUM CHLORIDE 600; 310; 30; 20 MG/100ML; MG/100ML; MG/100ML; MG/100ML
INJECTION, SOLUTION INTRAVENOUS CONTINUOUS
Status: DISCONTINUED | OUTPATIENT
Start: 2017-12-08 | End: 2017-12-09

## 2017-12-08 RX ORDER — TERBUTALINE SULFATE 1 MG/ML
0.25 INJECTION SUBCUTANEOUS
Status: DISCONTINUED | OUTPATIENT
Start: 2017-12-08 | End: 2017-12-09

## 2017-12-08 RX ADMIN — Medication 5 MILLION UNITS: at 04:12

## 2017-12-08 RX ADMIN — SODIUM CHLORIDE, SODIUM LACTATE, POTASSIUM CHLORIDE, AND CALCIUM CHLORIDE: 600; 310; 30; 20 INJECTION, SOLUTION INTRAVENOUS at 03:12

## 2017-12-08 RX ADMIN — DEXTROSE 2.5 MILLION UNITS: 50 INJECTION, SOLUTION INTRAVENOUS at 08:12

## 2017-12-08 RX ADMIN — MISOPROSTOL 50 MCG: 100 TABLET ORAL at 04:12

## 2017-12-08 RX ADMIN — DEXTROSE 2.5 MILLION UNITS: 50 INJECTION, SOLUTION INTRAVENOUS at 11:12

## 2017-12-08 RX ADMIN — MISOPROSTOL 50 MCG: 100 TABLET ORAL at 09:12

## 2017-12-08 NOTE — ED PROVIDER NOTES
"Encounter Date: 2017       History     Chief Complaint   Patient presents with    Back Pain     Sara Arellano is a 27 y.o.  at 37w0d who presents to the OB ED complaining of back pains since this morning.  The patient states that her current complaints are similar to what she felt during her first delivery.  She states that she had little to no labor pains during her first delivery and is concerned that this could happen again.  The patient states that she may have lost her mucous plug given the discharge she noted overnight.   She describes the discharge as small in volume and as "blood-tinged" in nature. She denies any margarito bleeding.  She states that fetal movements have been active and persistent.  She denies any other complaints apart from those listed above.      This IUP is complicated by GBS + Status.          Review of patient's allergies indicates:  No Known Allergies  Past Medical History:   Diagnosis Date    Abnormal Pap smear of cervix     ASCUS + HPV     Fever blister      Past Surgical History:   Procedure Laterality Date    EYE SURGERY       Family History   Problem Relation Age of Onset    Ovarian cancer Maternal Grandmother     Colon cancer Neg Hx     Breast cancer Neg Hx     Melanoma Neg Hx     Cancer Neg Hx      Social History   Substance Use Topics    Smoking status: Current Some Day Smoker     Packs/day: 0.50     Types: Cigarettes    Smokeless tobacco: Never Used      Comment: stopped with +UPT     Alcohol use Yes      Comment: pre pregnancy     Review of Systems   Constitutional: Negative for chills and fever.   HENT: Negative for congestion and sore throat.    Eyes: Negative for visual disturbance.   Respiratory: Negative for shortness of breath.    Cardiovascular: Negative for chest pain.   Gastrointestinal: Negative for abdominal pain and nausea.   Genitourinary: Positive for vaginal bleeding (Pink tinged discharge) and vaginal discharge (mucous plug). " Negative for dysuria.   Musculoskeletal: Positive for back pain (Constant in nature with intermittent exacerbation in pain).   Skin: Negative for rash.   Neurological: Negative for weakness and headaches.   Hematological: Does not bruise/bleed easily.       Physical Exam     Initial Vitals   BP Pulse Resp Temp SpO2   12/08/17 1132 12/08/17 1132 12/08/17 1133 12/08/17 1131 12/08/17 1133   122/73 93 18 96.6 °F (35.9 °C) 100 %      MAP       12/08/17 1132       89.33         Physical Exam    Vitals reviewed.  Constitutional: She appears well-developed and well-nourished. She is not diaphoretic. No distress.   HENT:   Head: Normocephalic and atraumatic.   Eyes: Conjunctivae are normal.   Neck: Neck supple.   Cardiovascular: Normal rate and regular rhythm.   Pulmonary/Chest: Breath sounds normal. No respiratory distress.   Abdominal: Soft. She exhibits distension (gravid uterus).   Genitourinary: Vagina normal.   Neurological: She is alert and oriented to person, place, and time.   Skin: Skin is warm and dry.   Psychiatric: She has a normal mood and affect. Her behavior is normal. Judgment and thought content normal.     OB LABOR EXAM:   Pre-Term Labor: No.   Membranes ruptured: No.   Method: Sterile vaginal exam per MD.   Vaginal Bleeding: none present.     Dilatation: 0.   Station: -3.   Effacement: 50%.   Amniotic Fluid Color: no fluid.           ED Course   Obtain Fetal nonstress test (NST)  Date/Time: 12/8/2017 11:52 AM  Performed by: SHAMEKA WU  Authorized by: SHAMEKA WU     Nonstress Test:     Variability:  6-25 BPM    Decelerations:  None    Accelerations:  15 bpm    Baseline:  140    Contractions:  Irregular  Biophysical Profile:     Nonstress Test Interpretation: reactive      Overall Impression:  Reassuring        Labs Reviewed - No data to display          Medical Decision Making:   ED Management:  SVE - .5/50/-3  NST - Cat 1 - Reassuring - Irregular CTX   Urine Dip - WNL  PO Hydration  SSE - Neg  Pooling, Neg Valsalva, Neg Nitrazine   ROM Plus - Neg  U/S - Without appreciable pocket of fluid                   ED Course      Clinical Impression:   The primary encounter diagnosis was Oligohydramnios antepartum, third trimester, not applicable or unspecified fetus. Diagnoses of 37 weeks gestation of pregnancy and Labor, false ([place]), antepartum were also pertinent to this visit.        - No clinical evidence of rupture on exam  - Patient not found to be in active labor  - Will admit patient to L&D for IOL secondary at noted Oligohydramnios at 37 weeks gestation    Mikey Lynn M.D.  PGY1 OB/GYN                     Mikey Pollard MD  Resident  12/08/17 2016

## 2017-12-08 NOTE — ANESTHESIA PREPROCEDURE EVALUATION
2017  Sara Arellano is a 27 y.o., female.    Sara Arellano is a 27 y.o. female  @ 37w0d w/ h/o tobacco abuse and anemia here for IOL. No issues w/ current pregnancy.    W/ prev pregnancy, pt did not feel her contractions till late and arrived at the hospital @ 8cm and delivered shortly thereafter. Pt having similar lower back discomfort ot prev pregnancy and her OB sent her to [place] for her IOL to avoid a similar situation.    Epidural w/ last pregnancy, no issues.     OB History    Para Term  AB Living   2 1 1 0 0 1   SAB TAB Ectopic Multiple Live Births   0 0 0 0 1      # Outcome Date GA Lbr Roman/2nd Weight Sex Delivery Anes PTL Lv   2 Current            1 Term 16 38w4d / 01:20 3.12 kg (6 lb 14.1 oz) F Vag-Spont EPI N FLORENTINO          Wt Readings from Last 1 Encounters:   17 1500 75.8 kg (167 lb)       BP Readings from Last 3 Encounters:   17 122/73   17 118/78   17 118/68       Patient Active Problem List   Diagnosis    Ovarian cyst    Cervical high risk HPV (human papillomavirus) test positive    Labor, false ([place]), antepartum       Past Surgical History:   Procedure Laterality Date    EYE SURGERY         Social History     Social History    Marital status: Single     Spouse name: N/A    Number of children: N/A    Years of education: N/A     Occupational History    Not on file.     Social History Main Topics    Smoking status: Current Some Day Smoker     Packs/day: 0.50     Types: Cigarettes    Smokeless tobacco: Never Used      Comment: stopped with +UPT     Alcohol use Yes      Comment: pre pregnancy    Drug use: No      Comment: pre pregnancy    Sexual activity: Yes     Partners: Male     Other Topics Concern    Not on file     Social History Narrative    No narrative on file         Chemistry        Component Value  Date/Time     11/24/2017 1008    K 3.9 11/24/2017 1008     11/24/2017 1008    CO2 24 11/24/2017 1008    BUN 12 11/24/2017 1008    CREATININE 0.6 11/24/2017 1008    GLU 74 11/24/2017 1008        Component Value Date/Time    CALCIUM 8.8 11/24/2017 1008    ESTGFRAFRICA >60 11/24/2017 1008    EGFRNONAA >60 11/24/2017 1008            Lab Results   Component Value Date    WBC 10.12 11/24/2017    HGB 11.0 (L) 11/24/2017    HCT 34.8 (L) 11/24/2017    MCV 92 11/24/2017     11/24/2017       No results for input(s): PT, INR, PROTIME, APTT in the last 72 hours.                  Anesthesia Evaluation    I have reviewed the Patient Summary Reports.    I have reviewed the Nursing Notes.   I have reviewed the Medications.     Review of Systems  Anesthesia Hx:  No problems with previous Anesthesia  History of prior surgery of interest to airway management or planning: Denies Family Hx of Anesthesia complications.   Denies Personal Hx of Anesthesia complications.   Hematology/Oncology:     Oncology Normal    -- Denies Anemia:  -- Denies Thrombocytopenia:   -- Coag Disorders: Denies Bleeding Disorder:   -- Denies Hypercoagulable state - Immunodeficiency Disorder:    EENT/Dental:EENT/Dental Normal   Cardiovascular:  Cardiovascular Normal     Pulmonary:  Pulmonary Normal    Renal/:  Renal/ Normal     Hepatic/GI:  Hepatic/GI Normal    Musculoskeletal:   Denies Arthritis.   Denies Spine Disorders    Neurological:  Neurology Normal   Denies Chronic Pain Syndrome Denies Dementia        Physical Exam  General:  Well nourished    Airway/Jaw/Neck:  Airway Findings: Mouth Opening: Normal Tongue: Normal  General Airway Assessment: Adult  Mallampati: II  Improves to I with phonation.  TM Distance: Normal, at least 6 cm  Jaw/Neck Findings:  Neck ROM: Normal ROM      Dental:  Dental Findings: In tact   Chest/Lungs:  Chest/Lungs Findings: Clear to auscultation, Normal Respiratory Rate     Heart/Vascular:  Heart Findings: Rate:  Normal  Rhythm: Regular Rhythm  Sounds: Normal  Heart murmur: negative Vascular Findings: Normal    Abdomen:  Abdomen Findings:     Musculoskeletal:  Musculoskeletal Findings: Normal   Skin:  Skin Findings: Normal    Mental Status:  Mental Status Findings: Normal        Anesthesia Plan  Type of Anesthesia, risks & benefits discussed:  Anesthesia Type:  CSE, epidural, general, spinal  Patient's Preference:   Intra-op Monitoring Plan: standard ASA monitors  Intra-op Monitoring Plan Comments:   Post Op Pain Control Plan:   Post Op Pain Control Plan Comments:   Induction:   IV and rapid sequence  Beta Blocker:  Patient is not currently on a Beta-Blocker (No further documentation required).       Informed Consent: Patient understands risks and agrees with Anesthesia plan.  Questions answered. Anesthesia consent signed with patient.  ASA Score: 2     Day of Surgery Review of History & Physical:    H&P update referred to the surgeon.         Ready For Surgery From Anesthesia Perspective.

## 2017-12-09 LAB
ALLENS TEST: ABNORMAL
HCO3 UR-SCNC: 22.9 MMOL/L (ref 24–28)
PCO2 BLDA: 48.8 MMHG (ref 35–45)
PH SMN: 7.28 [PH] (ref 7.35–7.45)
PO2 BLDA: 18 MMHG (ref 80–100)
POC BE: -4 MMOL/L
POC SATURATED O2: 22 % (ref 95–100)
SAMPLE: ABNORMAL
SITE: ABNORMAL

## 2017-12-09 PROCEDURE — 25000003 PHARM REV CODE 250: Performed by: ANESTHESIOLOGY

## 2017-12-09 PROCEDURE — 72100003 HC LABOR CARE, EA. ADDL. 8 HRS

## 2017-12-09 PROCEDURE — 59409 OBSTETRICAL CARE: CPT | Mod: AA,,, | Performed by: ANESTHESIOLOGY

## 2017-12-09 PROCEDURE — 10907ZC DRAINAGE OF AMNIOTIC FLUID, THERAPEUTIC FROM PRODUCTS OF CONCEPTION, VIA NATURAL OR ARTIFICIAL OPENING: ICD-10-PCS | Performed by: OBSTETRICS & GYNECOLOGY

## 2017-12-09 PROCEDURE — 25000003 PHARM REV CODE 250: Performed by: OBSTETRICS & GYNECOLOGY

## 2017-12-09 PROCEDURE — 63600175 PHARM REV CODE 636 W HCPCS

## 2017-12-09 PROCEDURE — 63600175 PHARM REV CODE 636 W HCPCS: Performed by: STUDENT IN AN ORGANIZED HEALTH CARE EDUCATION/TRAINING PROGRAM

## 2017-12-09 PROCEDURE — 72200005 HC VAGINAL DELIVERY LEVEL II

## 2017-12-09 PROCEDURE — 25000003 PHARM REV CODE 250

## 2017-12-09 PROCEDURE — 27200710 HC EPIDURAL INFUSION PUMP SET: Performed by: ANESTHESIOLOGY

## 2017-12-09 PROCEDURE — 11000001 HC ACUTE MED/SURG PRIVATE ROOM

## 2017-12-09 PROCEDURE — 63600175 PHARM REV CODE 636 W HCPCS: Performed by: ANESTHESIOLOGY

## 2017-12-09 PROCEDURE — 27800517 HC TRAY,EPIDURAL-CONTINUOUS: Performed by: ANESTHESIOLOGY

## 2017-12-09 PROCEDURE — 0HQ9XZZ REPAIR PERINEUM SKIN, EXTERNAL APPROACH: ICD-10-PCS | Performed by: OBSTETRICS & GYNECOLOGY

## 2017-12-09 PROCEDURE — 82803 BLOOD GASES ANY COMBINATION: CPT

## 2017-12-09 PROCEDURE — 51702 INSERT TEMP BLADDER CATH: CPT

## 2017-12-09 PROCEDURE — 59409 OBSTETRICAL CARE: CPT | Mod: AT,,, | Performed by: OBSTETRICS & GYNECOLOGY

## 2017-12-09 PROCEDURE — 25000003 PHARM REV CODE 250: Performed by: STUDENT IN AN ORGANIZED HEALTH CARE EDUCATION/TRAINING PROGRAM

## 2017-12-09 PROCEDURE — 3E033VJ INTRODUCTION OF OTHER HORMONE INTO PERIPHERAL VEIN, PERCUTANEOUS APPROACH: ICD-10-PCS | Performed by: OBSTETRICS & GYNECOLOGY

## 2017-12-09 PROCEDURE — 99900035 HC TECH TIME PER 15 MIN (STAT)

## 2017-12-09 PROCEDURE — 27000181 HC CABLE, IUPC

## 2017-12-09 PROCEDURE — 62326 NJX INTERLAMINAR LMBR/SAC: CPT | Performed by: ANESTHESIOLOGY

## 2017-12-09 RX ORDER — SODIUM CITRATE AND CITRIC ACID MONOHYDRATE 334; 500 MG/5ML; MG/5ML
30 SOLUTION ORAL ONCE
Status: DISCONTINUED | OUTPATIENT
Start: 2017-12-09 | End: 2017-12-09

## 2017-12-09 RX ORDER — BUPIVACAINE HYDROCHLORIDE 2.5 MG/ML
INJECTION, SOLUTION EPIDURAL; INFILTRATION; INTRACAUDAL
Status: COMPLETED
Start: 2017-12-09 | End: 2017-12-09

## 2017-12-09 RX ORDER — OXYTOCIN/RINGER'S LACTATE 20/1000 ML
2 PLASTIC BAG, INJECTION (ML) INTRAVENOUS CONTINUOUS
Status: DISCONTINUED | OUTPATIENT
Start: 2017-12-09 | End: 2017-12-09

## 2017-12-09 RX ORDER — ONDANSETRON 8 MG/1
8 TABLET, ORALLY DISINTEGRATING ORAL EVERY 8 HOURS PRN
Status: DISCONTINUED | OUTPATIENT
Start: 2017-12-09 | End: 2017-12-11 | Stop reason: HOSPADM

## 2017-12-09 RX ORDER — FENTANYL CITRATE 50 UG/ML
INJECTION, SOLUTION INTRAMUSCULAR; INTRAVENOUS
Status: COMPLETED
Start: 2017-12-09 | End: 2017-12-09

## 2017-12-09 RX ORDER — DIPHENHYDRAMINE HCL 25 MG
25 CAPSULE ORAL EVERY 4 HOURS PRN
Status: DISCONTINUED | OUTPATIENT
Start: 2017-12-09 | End: 2017-12-11 | Stop reason: HOSPADM

## 2017-12-09 RX ORDER — FENTANYL/BUPIVACAINE/NS/PF 2MCG/ML-.1
PLASTIC BAG, INJECTION (ML) INJECTION
Status: COMPLETED
Start: 2017-12-09 | End: 2017-12-09

## 2017-12-09 RX ORDER — LIDOCAINE HYDROCHLORIDE AND EPINEPHRINE 15; 5 MG/ML; UG/ML
INJECTION, SOLUTION EPIDURAL
Status: DISCONTINUED | OUTPATIENT
Start: 2017-12-09 | End: 2017-12-09

## 2017-12-09 RX ORDER — DIPHENHYDRAMINE HYDROCHLORIDE 50 MG/ML
25 INJECTION INTRAMUSCULAR; INTRAVENOUS EVERY 4 HOURS PRN
Status: DISCONTINUED | OUTPATIENT
Start: 2017-12-09 | End: 2017-12-11 | Stop reason: HOSPADM

## 2017-12-09 RX ORDER — FENTANYL/BUPIVACAINE/NS/PF 2MCG/ML-.1
PLASTIC BAG, INJECTION (ML) INJECTION CONTINUOUS PRN
Status: DISCONTINUED | OUTPATIENT
Start: 2017-12-09 | End: 2017-12-09

## 2017-12-09 RX ORDER — MISOPROSTOL 100 MCG
25 TABLET ORAL ONCE
Status: COMPLETED | OUTPATIENT
Start: 2017-12-09 | End: 2017-12-09

## 2017-12-09 RX ORDER — IBUPROFEN 600 MG/1
600 TABLET ORAL EVERY 6 HOURS
Status: DISCONTINUED | OUTPATIENT
Start: 2017-12-09 | End: 2017-12-11 | Stop reason: HOSPADM

## 2017-12-09 RX ORDER — FENTANYL/BUPIVACAINE/NS/PF 2MCG/ML-.1
PLASTIC BAG, INJECTION (ML) INJECTION CONTINUOUS
Status: DISCONTINUED | OUTPATIENT
Start: 2017-12-09 | End: 2017-12-09

## 2017-12-09 RX ORDER — OXYTOCIN/RINGER'S LACTATE 20/1000 ML
41.65 PLASTIC BAG, INJECTION (ML) INTRAVENOUS CONTINUOUS
Status: ACTIVE | OUTPATIENT
Start: 2017-12-09 | End: 2017-12-10

## 2017-12-09 RX ORDER — HYDROCODONE BITARTRATE AND ACETAMINOPHEN 10; 325 MG/1; MG/1
1 TABLET ORAL EVERY 4 HOURS PRN
Status: DISCONTINUED | OUTPATIENT
Start: 2017-12-09 | End: 2017-12-11 | Stop reason: HOSPADM

## 2017-12-09 RX ORDER — NALBUPHINE HYDROCHLORIDE 10 MG/ML
2.5 INJECTION, SOLUTION INTRAMUSCULAR; INTRAVENOUS; SUBCUTANEOUS EVERY 4 HOURS PRN
Status: DISCONTINUED | OUTPATIENT
Start: 2017-12-09 | End: 2017-12-09

## 2017-12-09 RX ORDER — HYDROCORTISONE 25 MG/G
CREAM TOPICAL 3 TIMES DAILY PRN
Status: DISCONTINUED | OUTPATIENT
Start: 2017-12-09 | End: 2017-12-11 | Stop reason: HOSPADM

## 2017-12-09 RX ORDER — ACETAMINOPHEN 325 MG/1
650 TABLET ORAL EVERY 6 HOURS PRN
Status: DISCONTINUED | OUTPATIENT
Start: 2017-12-09 | End: 2017-12-11 | Stop reason: HOSPADM

## 2017-12-09 RX ORDER — HYDROCODONE BITARTRATE AND ACETAMINOPHEN 5; 325 MG/1; MG/1
1 TABLET ORAL EVERY 4 HOURS PRN
Status: DISCONTINUED | OUTPATIENT
Start: 2017-12-09 | End: 2017-12-11 | Stop reason: HOSPADM

## 2017-12-09 RX ORDER — FAMOTIDINE 10 MG/ML
20 INJECTION INTRAVENOUS ONCE
Status: DISCONTINUED | OUTPATIENT
Start: 2017-12-09 | End: 2017-12-09

## 2017-12-09 RX ORDER — DOCUSATE SODIUM 100 MG/1
200 CAPSULE, LIQUID FILLED ORAL 2 TIMES DAILY PRN
Status: DISCONTINUED | OUTPATIENT
Start: 2017-12-09 | End: 2017-12-10

## 2017-12-09 RX ADMIN — Medication: at 11:12

## 2017-12-09 RX ADMIN — SODIUM CHLORIDE, SODIUM LACTATE, POTASSIUM CHLORIDE, AND CALCIUM CHLORIDE: 600; 310; 30; 20 INJECTION, SOLUTION INTRAVENOUS at 03:12

## 2017-12-09 RX ADMIN — FENTANYL CITRATE: 50 INJECTION INTRAMUSCULAR; INTRAVENOUS at 11:12

## 2017-12-09 RX ADMIN — BUPIVACAINE HYDROCHLORIDE: 2.5 INJECTION, SOLUTION EPIDURAL; INFILTRATION; INTRACAUDAL; PERINEURAL at 11:12

## 2017-12-09 RX ADMIN — DEXTROSE 2.5 MILLION UNITS: 50 INJECTION, SOLUTION INTRAVENOUS at 04:12

## 2017-12-09 RX ADMIN — Medication 5 ML: at 11:12

## 2017-12-09 RX ADMIN — Medication 25 MCG: at 06:12

## 2017-12-09 RX ADMIN — Medication 10 ML/HR: at 12:12

## 2017-12-09 RX ADMIN — DEXTROSE 2.5 MILLION UNITS: 50 INJECTION, SOLUTION INTRAVENOUS at 01:12

## 2017-12-09 RX ADMIN — Medication 2 MILLI-UNITS/MIN: at 01:12

## 2017-12-09 RX ADMIN — SODIUM CHLORIDE, SODIUM LACTATE, POTASSIUM CHLORIDE, AND CALCIUM CHLORIDE: 600; 310; 30; 20 INJECTION, SOLUTION INTRAVENOUS at 06:12

## 2017-12-09 RX ADMIN — MISOPROSTOL 50 MCG: 100 TABLET ORAL at 01:12

## 2017-12-09 RX ADMIN — IBUPROFEN 600 MG: 600 TABLET, FILM COATED ORAL at 07:12

## 2017-12-09 RX ADMIN — SODIUM CHLORIDE, SODIUM LACTATE, POTASSIUM CHLORIDE, AND CALCIUM CHLORIDE 1000 ML: 600; 310; 30; 20 INJECTION, SOLUTION INTRAVENOUS at 01:12

## 2017-12-09 RX ADMIN — DEXTROSE 2.5 MILLION UNITS: 50 INJECTION, SOLUTION INTRAVENOUS at 07:12

## 2017-12-09 RX ADMIN — FENTANYL CITRATE 100 MCG: 50 INJECTION, SOLUTION INTRAMUSCULAR; INTRAVENOUS at 12:12

## 2017-12-09 RX ADMIN — SODIUM CHLORIDE, SODIUM LACTATE, POTASSIUM CHLORIDE, AND CALCIUM CHLORIDE 1000 ML: 600; 310; 30; 20 INJECTION, SOLUTION INTRAVENOUS at 11:12

## 2017-12-09 RX ADMIN — Medication 5 ML: at 12:12

## 2017-12-09 RX ADMIN — PROMETHAZINE HYDROCHLORIDE 12.5 MG: 25 INJECTION INTRAMUSCULAR; INTRAVENOUS at 12:12

## 2017-12-09 RX ADMIN — LIDOCAINE HYDROCHLORIDE,EPINEPHRINE BITARTRATE 3 ML: 15; .005 INJECTION, SOLUTION EPIDURAL; INFILTRATION; INTRACAUDAL; PERINEURAL at 11:12

## 2017-12-09 NOTE — HPI
"Sara rAellano is a 27 y.o.  at 37w0d who presented to the OB ED complaining of back pains since this morning. The patient stated that her current complaints are similar to what she felt during her first delivery.  She stated that she had little to no labor pains during her first delivery and was concerned that it could happen again.  The patient stated that she may have lost her mucous plug given the discharge she noted overnight.  She describes the discharge as small in volume and as "blood-tinged" in nature. She denied any margarito bleeding.  She stated that fetal movements have been active and persistent. On evaluation in the OB ED she was found to 1/50/-3 with a reactive, category 1 NST.  Evaluation for ruptured membranes was negative for pooling, Nitrazine, and ferning.  An ultrasound at bedside demonstrated no measurable pockets, raising concerns for oligohydramnios. Given her gestational age and lack of amniotic fluid, the decision was made to induce labor.      This IUP is complicated by GBS + Status.  "

## 2017-12-09 NOTE — ASSESSMENT & PLAN NOTE
- Consents signed and to chart  - Admit to Labor and Delivery unit  - Plan for induction is Cytotec 50 PO  - Epidural per Anesthesia  - Draw CBC, T&S  - Notify Staff - Dr. Crockett  - Ultrasound performed, fetus in vertex position.   - Recheck in 2 hrs or PRN  - Post-Partum Hemorrhage risk - low

## 2017-12-09 NOTE — PROGRESS NOTES
"LABOR NOTE    S:  Complaints: No.  Epidural working:  not applicable      O: /71   Pulse 84   Temp 97 °F (36.1 °C)   Resp 20   Ht 5' 4" (1.626 m)   Wt 75.8 kg (167 lb)   LMP 2017 (Approximate)   SpO2 100%   Breastfeeding? Yes   BMI 28.67 kg/m²       FHT: 140, moderate variability, accels present, no decells, Category 1 - Reassuring  CTX: q3 min  SVE: /-3      ASSESSMENT:   27 y.o.  at 37w1d, IOL 2/2 Olighydramnios    FHT reassuring    Active Hospital Problems    Diagnosis  POA    *Oligohydramnios in third trimester [O41.03X0]  Unknown    Indication for care in labor and delivery, antepartum [O75.9]  Unknown    Group B streptococcal infection during pregnancy [O98.819, B95.1]  Unknown      Resolved Hospital Problems    Diagnosis Date Resolved POA    Labor, false ([place]), antepartum [O47.9] 2017 Yes         PLAN:    Continue Close Maternal/Fetal Monitoring  Strip Cat 1 - Reassuring  S/p cytotec x 4 for cervical ripening, now with cervical change  Begin pitocin augmentation per protocol  Recheck 2 hours or PRN    Lillian Do MD  OB/GYN, PGY-2    "

## 2017-12-09 NOTE — PROGRESS NOTES
"LABOR NOTE    S:  Complaints: No.  Epidural working:  not applicable    O: /72   Pulse 89   Temp 96.6 °F (35.9 °C) (Temporal)   Resp 18   Ht 5' 4" (1.626 m)   Wt 75.8 kg (167 lb)   LMP 2017 (Approximate)   SpO2 100%   Breastfeeding? Yes   BMI 28.67 kg/m²     FHT: 130, moderate variability, +accels, no decels, Category 1 - Reassuring  CTX: Irregular  SVE: /-3     ASSESSMENT:   27 y.o.  at 37w1d, IOL 2/2 Oligohydramnios. FHT reassuring    Active Hospital Problems    Diagnosis  POA    *Oligohydramnios in third trimester [O41.03X0]  Unknown    Indication for care in labor and delivery, antepartum [O75.9]  Unknown    Group B streptococcal infection during pregnancy [O98.819, B95.1]  Unknown      Resolved Hospital Problems    Diagnosis Date Resolved POA    Labor, false ([place]), antepartum [O47.9] 2017 Yes         PLAN:    Continue Close Maternal/Fetal Monitoring  S/P Cytotec 50 x2, 3rd dose now   Reassuring Strip  Recheck 4 hours or PRN    Sanna Harris MD PGY-4  Ob-Gyn Resident   # [phone]          "

## 2017-12-09 NOTE — PROGRESS NOTES
"LABOR NOTE    S:  Complaints: No.  Epidural working:  not applicable      O: /75   Pulse 83   Temp 97.2 °F (36.2 °C) (Temporal)   Resp 18   Ht 5' 4" (1.626 m)   Wt 75.8 kg (167 lb)   LMP 2017 (Approximate)   SpO2 100%   Breastfeeding? Yes   BMI 28.67 kg/m²       FHT: 140, moderate variability, accels present, no decells, Category 1 - Reassuring  CTX: Irregular  SVE: /-3      ASSESSMENT:   27 y.o.  at 37w0d, IOL 2/2 Oligohydramnios    FHT reassuring    Active Hospital Problems    Diagnosis  POA    *Oligohydramnios in third trimester [O41.03X0]  Unknown    Indication for care in labor and delivery, antepartum [O75.9]  Unknown    Group B streptococcal infection during pregnancy [O98.819, B95.1]  Unknown      Resolved Hospital Problems    Diagnosis Date Resolved POA    Labor, false ([place]), antepartum [O47.9] 2017 Yes         PLAN:    Continue Close Maternal/Fetal Monitoring  S/P Cytotec 50   Repeat Cytotec 50 PO   Reassuring Strip  Recheck 4 hours or PRN    Mikey Lynn M.D.  PGY1 OB/GYN          "

## 2017-12-09 NOTE — H&P
"[facility]  Obstetrics  History & Physical    Patient Name: Sara Rubio  MRN: [id number]  Admission Date: 2017  Primary Care Provider: Primary Doctor No    Subjective:     Principal Problem:Oligohydramnios in third trimester    History of Present Illness:  Sara Rubio is a 27 y.o.  at 37w0d who presented to the OB ED complaining of back pains since this morning. The patient stated that her current complaints are similar to what she felt during her first delivery.  She stated that she had little to no labor pains during her first delivery and was concerned that it could happen again.  The patient stated that she may have lost her mucous plug given the discharge she noted overnight.  She describes the discharge as small in volume and as "blood-tinged" in nature. She denied any margarito bleeding.  She stated that fetal movements have been active and persistent. On evaluation in the OB ED she was found to 1/50/-3 with a reactive, category 1 NST.  Evaluation for ruptured membranes was negative for pooling, Nitrazine, and ferning.  An ultrasound at bedside demonstrated no measurable pockets, raising concerns for oligohydramnios. Given her gestational age and lack of amniotic fluid, the decision was made to induce labor.      This IUP is complicated by GBS + Status.      Obstetric History       T1      L1     SAB0   TAB0   Ectopic0   Multiple0   Live Births1       # Outcome Date GA Lbr Roman/2nd Weight Sex Delivery Anes PTL Lv   2 Current            1 Term 16 38w4d / 01:20 3.12 kg (6 lb 14.1 oz) F Vag-Spont EPI N FLORENTINO      Name: IRISH RUBIO      Apgar1:  8                Apgar5: 9        Past Medical History:   Diagnosis Date    Abnormal Pap smear of cervix 2016    ASCUS + HPV     Fever blister      Past Surgical History:   Procedure Laterality Date    EYE SURGERY         PTA Medications   Medication Sig    folic acid (FOLVITE) 1 MG tablet Take 1 mg by " mouth once daily.    PRENATAL VIT/IRON FUMARATE/FA (PRENATAL 1+1 ORAL) Take by mouth.       Review of patient's allergies indicates:  No Known Allergies     Family History     Problem Relation (Age of Onset)    Ovarian cancer Maternal Grandmother        Social History Main Topics    Smoking status: Current Some Day Smoker     Packs/day: 0.50     Types: Cigarettes    Smokeless tobacco: Never Used      Comment: stopped with +UPT     Alcohol use Yes      Comment: pre pregnancy    Drug use: No      Comment: pre pregnancy    Sexual activity: Yes     Partners: Male     Review of Systems   Constitutional: Negative for chills and fever.   Eyes: Negative for visual disturbance.   Respiratory: Negative for shortness of breath.    Cardiovascular: Negative for chest pain.   Gastrointestinal: Negative for diarrhea, nausea and vomiting.   Genitourinary: Positive for vaginal discharge (Mucous plug). Negative for dysuria, hematuria and vaginal bleeding.   Musculoskeletal: Positive for back pain.   Skin:  Negative for rash.   Neurological: Negative for headaches.   Psychiatric/Behavioral: Negative.       Objective:     Vital Signs (Most Recent):  Temp: 97 °F (36.1 °C) (12/08/17 1850)  Pulse: 85 (12/08/17 1900)  Resp: 18 (12/08/17 1850)  BP: 124/66 (12/08/17 1900)  SpO2: 99 % (12/08/17 1900) Vital Signs (24h Range):  Temp:  [96.6 °F (35.9 °C)-97.7 °F (36.5 °C)] 97 °F (36.1 °C)  Pulse:  [78-97] 85  Resp:  [18] 18  SpO2:  [99 %-100 %] 99 %  BP: (113-124)/(59-74) 124/66     Weight: 75.8 kg (167 lb)  Body mass index is 28.67 kg/m².    FHT: 140, moderate variability, accels present, no decells, Category 1 - Reassuring  TOCO: Irregular contraction pattern    Physical Exam:   Constitutional: She is oriented to person, place, and time. She appears well-developed and well-nourished. No distress.    HENT:   Head: Normocephalic and atraumatic.    Eyes: Conjunctivae are normal.    Neck: Neck supple.    Cardiovascular: Normal rate, regular  rhythm and intact distal pulses.     Pulmonary/Chest: Effort normal. No respiratory distress.        Abdominal: She exhibits distension. There is no tenderness. There is no rebound and no guarding.     Genitourinary: Vagina normal.           Musculoskeletal: Normal range of motion and moves all extremeties.       Neurological: She is alert and oriented to person, place, and time. She has normal reflexes.    Skin: Skin is warm and dry. She is not diaphoretic.    Psychiatric: She has a normal mood and affect. Her behavior is normal. Judgment and thought content normal.       Cervix:  Dilation:  1  Effacement:  50%  Station: -3  Presentation: Vertex     Significant Labs:  Lab Results   Component Value Date    GROUPTRH O POS 2017    HEPBSAG Negative 2017    STREPBCULT  2017     STREPTOCOCCUS AGALACTIAE (GROUP B)  Beta-hemolytic streptococci are routinely susceptible to   penicillins,cephalosporins and carbapenems.         I have personallly reviewed all pertinent lab results from the last 24 hours.    Assessment/Plan:     27 y.o. female  at 37w0d for:    * Oligohydramnios in third trimester    - Noted on ultrasound in OB ED  - No discernable pocket of amniotic fluid        Group B streptococcal infection during pregnancy    - Penicillin        Indication for care in labor and delivery, antepartum    - Consents signed and to chart  - Admit to Labor and Delivery unit  - Plan for induction is Cytotec 50 PO  - Epidural per Anesthesia  - Draw CBC, T&S  - Notify Staff - Dr. Crockett  - Ultrasound performed, fetus in vertex position.   - Recheck in 2 hrs or PRN  - Post-Partum Hemorrhage risk - low                  Mikey Pollard MD  Obstetrics  [facility]

## 2017-12-09 NOTE — SUBJECTIVE & OBJECTIVE
Obstetric History       T1      L1     SAB0   TAB0   Ectopic0   Multiple0   Live Births1       # Outcome Date GA Lbr Roman/2nd Weight Sex Delivery Anes PTL Lv   2 Current            1 Term 16 38w4d / 01:20 3.12 kg (6 lb 14.1 oz) F Vag-Spont EPI N FLOR[place]INO      Name: IRISH RUBIO      Apgar1:  8                Apgar5: 9        Past Medical History:   Diagnosis Date    Abnormal Pap smear of cervix     ASCUS + HPV     Fever blister      Past Surgical History:   Procedure Laterality Date    EYE SURGERY         PTA Medications   Medication Sig    folic acid (FOLVITE) 1 MG tablet Take 1 mg by mouth once daily.    PRENATAL VIT/IRON FUMARATE/FA (PRENATAL 1+1 ORAL) Take by mouth.       Review of patient's allergies indicates:  No Known Allergies     Family History     Problem Relation (Age of Onset)    Ovarian cancer Maternal Grandmother        Social History Main Topics    Smoking status: Current Some Day Smoker     Packs/day: 0.50     Types: Cigarettes    Smokeless tobacco: Never Used      Comment: stopped with +UPT     Alcohol use Yes      Comment: pre pregnancy    Drug use: No      Comment: pre pregnancy    Sexual activity: Yes     Partners: Male     Review of Systems   Constitutional: Negative for chills and fever.   Eyes: Negative for visual disturbance.   Respiratory: Negative for shortness of breath.    Cardiovascular: Negative for chest pain.   Gastrointestinal: Negative for diarrhea, nausea and vomiting.   Genitourinary: Positive for vaginal discharge (Mucous plug). Negative for dysuria, hematuria and vaginal bleeding.   Musculoskeletal: Positive for back pain.   Skin:  Negative for rash.   Neurological: Negative for headaches.   Psychiatric/Behavioral: Negative.       Objective:     Vital Signs (Most Recent):  Temp: 97 °F (36.1 °C) (17)  Pulse: 85 (17)  Resp: 18 (17)  BP: 124/66 (17)  SpO2: 99 % (17) Vital Signs (24h  Range):  Temp:  [96.6 °F (35.9 °C)-97.7 °F (36.5 °C)] 97 °F (36.1 °C)  Pulse:  [78-97] 85  Resp:  [18] 18  SpO2:  [99 %-100 %] 99 %  BP: (113-124)/(59-74) 124/66     Weight: 75.8 kg (167 lb)  Body mass index is 28.67 kg/m².    FHT: 140, moderate variability, accels present, no decells, Category 1 - Reassuring  TOCO: Irregular contraction pattern    Physical Exam:   Constitutional: She is oriented to person, place, and time. She appears well-developed and well-nourished. No distress.    HENT:   Head: Normocephalic and atraumatic.    Eyes: Conjunctivae are normal.    Neck: Neck supple.    Cardiovascular: Normal rate, regular rhythm and intact distal pulses.     Pulmonary/Chest: Effort normal. No respiratory distress.        Abdominal: She exhibits distension. There is no tenderness. There is no rebound and no guarding.     Genitourinary: Vagina normal.           Musculoskeletal: Normal range of motion and moves all extremeties.       Neurological: She is alert and oriented to person, place, and time. She has normal reflexes.    Skin: Skin is warm and dry. She is not diaphoretic.    Psychiatric: She has a normal mood and affect. Her behavior is normal. Judgment and thought content normal.       Cervix:  Dilation:  1  Effacement:  50%  Station: -3  Presentation: Vertex     Significant Labs:  Lab Results   Component Value Date    GROUPTRH O POS 12/08/2017    HEPBSAG Negative 05/25/2017    STREPBCULT  11/24/2017     STREPTOCOCCUS AGALACTIAE (GROUP B)  Beta-hemolytic streptococci are routinely susceptible to   penicillins,cephalosporins and carbapenems.         I have personallly reviewed all pertinent lab results from the last 24 hours.

## 2017-12-09 NOTE — PROGRESS NOTES
"LABOR NOTE    S:  Complaints: No.  Epidural working:  yes      O: /72   Pulse 75   Temp 97.8 °F (36.6 °C) (Oral)   Resp 16   Ht 5' 4" (1.626 m)   Wt 75.8 kg (167 lb)   LMP 2017 (Approximate)   SpO2 100%   Breastfeeding? Yes   BMI 28.67 kg/m²       FHT: reactive Cat 1 (reassuring) 135bpm, + accels, no decels  CTX: q 3-4 minutes  SVE: 3/70/-2  Forebag palpated and ruptured. Bloody  IUPC placed    ASSESSMENT:   27 y.o.  at 37w1d, PROM    FHT reassuring    Active Hospital Problems    Diagnosis  POA    *Oligohydramnios in third trimester [O41.03X0]  Unknown    Indication for care in labor and delivery, antepartum [O75.9]  Unknown    Group B streptococcal infection during pregnancy [O98.819, B95.1]  Unknown      Resolved Hospital Problems    Diagnosis Date Resolved POA    Labor, false ([place]), antepartum [O47.9] 2017 Yes         PLAN:    Continue Close Maternal/Fetal Monitoring  Pitocin Augmentation per protocol  Recheck 2 hours or PRN    Chapis Rajput MD  PGY-4 OB/GYN  [phone]      "

## 2017-12-09 NOTE — L&D DELIVERY NOTE
To bedside, fetal vertex noted to be at perineum.    cephalic with 1 contraction.  Under epidural anesthesia.  Infant delivered OA over intact perineum.  Nuchal x1 reduced at introitus.  Female infant also tolerated the delivery well and was placed on mothers abdomen for skin to skin and bulb suctioning performed.  Cord clamped and cut after 30 seconds delayed.  Umbilical arterial gas and venous blood obtained.  Placenta delivered spontaneously and IV pitocin given.  No lacerations.  Uterine tone noted. No cervical lacerations.  Patient tolerated delivery well.   cc  Staff present for entire procedure.  S/L/N counts correct x2.     Delivery Information for  Ozzy Arellano    Birth information:  YOB: 2016   Time of birth: 5:21 PM   Sex: female   Head Delivery Date/Time: 2017  5:21 PM   Delivery type: Vaginal, Spontaneous Delivery   Gestational Age: 37w1d    Delivery Providers    Delivering clinician:  Kamari Shetty III, MD   Other personnel:   Provider Role   Lillian Do MD Resident   Denise Deluna RN Registered Nurse   Jennifer Lemons RN Registered Nurse   Lilo Maria RN Registered Nurse   [place] PERE[facility]                Measurements    Weight:  2296 g Length:  48.3 cm   Head circum.:  31.8 cm Chest circum.:  31.8 cm          Randle Assessment    Living status:  Living  Apgars:     1 Minute:   5 Minute:   10 Minute:   15 Minute:   20 Minute:     Skin Color:   0  1       Heart Rate:   2  2       Reflex Irritability:   2  2       Muscle Tone:   2  2       Respiratory Effort:   2  2       Total:   8  9               Apgars Assigned By:  CARLI MARIA         Assisted Delivery Details:    Forceps attempted?:  No  Vacuum extractor attempted?:  No         Shoulder Dystocia    Shoulder dystocia present?:  No           Presentation and Position    Presentation:   Vertex   Position:                   Interventions/Resuscitation    Method:  Bulb  Suctioning, Blow By Oxygen, Tactile Stimulation       Cord    Vessels:  3 vessels  Complications:  Nuchal  Nuchal Intervention:  reduced  Nuchal Cord Description:  loose nuchal cord  Number of Loops:  1  Delayed Cord Clamping?:  No  Cord Clamped Date/Time:  2017  5:22 PM  Cord Blood Disposition:  Sent with Baby  Gases Sent?:  Yes  Stem Cell Collection (by MD):  No       Placenta    Date and time:  2017  5:26 PM  Removal:  Spontaneous  Appearance:  Intact  Placenta disposition:  discarded           Labor Events:       labor: No     Labor Onset Date/Time:         Dilation Complete Date/Time: 2017 17:15     Start Pushing Date/Time: 2017 17:20     Rupture Date/Time:              Rupture type:           Fluid Amount:        Fluid Color:        Fluid Odor:        Membrane Status (PeriCalm): ARM (Artificial Rupture)      Rupture Date/Time (PeriCalm): 2017 13:57:00      Fluid Amount (PeriCalm): Moderate      Fluid Color (PeriCalm): Clear       steroids: None     Antibiotics given for GBS: Yes     Induction: misoprostol;other (see comments)     Indications for induction:        Augmentation: amniotomy;oxytocin     Indications for augmentation:       Labor complications: None     Additional complications:          Cervical ripening:                     Delivery:      Episiotomy: None     Indication for Episiotomy:       Perineal Lacerations: 1st Repaired:      Periurethral Laceration: none Repaired:     Labial Laceration: none Repaired:     Sulcus Laceration: none Repaired:     Vaginal Laceration: No Repaired:     Cervical Laceration: No Repaired:     Repair suture: None     Repair # of packets: 0     Vaginal delivery QBL (mL): 281      QBL (mL): 0     Combined Blood Loss (mL): 281     Vaginal Sweep Performed: Yes     Surgicount Correct: No       Other providers:       Anesthesia    Method:  Epidural          Details (if applicable):  Trial of Labor       Categorization:      Priority:     Indications for :     Incision Type:       Additional  information:  Forceps:    Vacuum:    Breech:    Observed anomalies    Other (Comments): 1800- Nicu team called to assess baby       I was present for and supervised the delivery.

## 2017-12-09 NOTE — PROGRESS NOTES
"LABOR NOTE    S:  Complaints: No.  Epidural working:  not applicable      O: /62   Pulse 78   Temp 97 °F (36.1 °C) (Temporal)   Resp 18   Ht 5' 4" (1.626 m)   Wt 75.8 kg (167 lb)   LMP 2017 (Approximate)   SpO2 100%   Breastfeeding? Yes   BMI 28.67 kg/m²       FHT: 140, moderate variability, accels present, no decells, Category 1 - Reassuring  CTX: Irregular 3 - 6 minutes  SVE: /-3      ASSESSMENT:   27 y.o.  at 37w1d, IOL 2/2 Olighydramnios    FHT reassuring    Active Hospital Problems    Diagnosis  POA    *Oligohydramnios in third trimester [O41.03X0]  Unknown    Indication for care in labor and delivery, antepartum [O75.9]  Unknown    Group B streptococcal infection during pregnancy [O98.819, B95.1]  Unknown      Resolved Hospital Problems    Diagnosis Date Resolved POA    Labor, false ([place]), antepartum [O47.9] 2017 Yes         PLAN:    Continue Close Maternal/Fetal Monitoring  Strip Cat 1 - Reassuring  Cervix unchanged s/p 3 doses PO Cytotec  Will try one dose of PV Cytotec  Will consider Cook Balloon +/- Pitocin if no change after PV Cytotec  Recheck 2 hours or PRN    Mikey Lynn M.D.  PGY1 OB/GYN  "

## 2017-12-10 PROBLEM — B95.1 GROUP B STREPTOCOCCAL INFECTION DURING PREGNANCY: Status: RESOLVED | Noted: 2017-12-08 | Resolved: 2017-12-10

## 2017-12-10 PROBLEM — O98.819 GROUP B STREPTOCOCCAL INFECTION DURING PREGNANCY: Status: RESOLVED | Noted: 2017-12-08 | Resolved: 2017-12-10

## 2017-12-10 PROBLEM — O41.03X0 OLIGOHYDRAMNIOS IN THIRD TRIMESTER: Status: RESOLVED | Noted: 2017-12-08 | Resolved: 2017-12-10

## 2017-12-10 LAB
BASOPHILS # BLD AUTO: ABNORMAL K/UL
BASOPHILS NFR BLD: 0 %
DIFFERENTIAL METHOD: ABNORMAL
EOSINOPHIL # BLD AUTO: ABNORMAL K/UL
EOSINOPHIL NFR BLD: 1 %
ERYTHROCYTE [DISTWIDTH] IN BLOOD BY AUTOMATED COUNT: 13.6 %
GIANT PLATELETS BLD QL SMEAR: PRESENT
HCT VFR BLD AUTO: 29.3 %
HGB BLD-MCNC: 9.4 G/DL
LYMPHOCYTES # BLD AUTO: ABNORMAL K/UL
LYMPHOCYTES NFR BLD: 17 %
MCH RBC QN AUTO: 29 PG
MCHC RBC AUTO-ENTMCNC: 32.1 G/DL
MCV RBC AUTO: 90 FL
MONOCYTES # BLD AUTO: ABNORMAL K/UL
MONOCYTES NFR BLD: 16 %
NEUTROPHILS # BLD AUTO: ABNORMAL K/UL
NEUTROPHILS NFR BLD: 66 %
PLATELET # BLD AUTO: 256 K/UL
PLATELET BLD QL SMEAR: ABNORMAL
PMV BLD AUTO: 10.1 FL
RBC # BLD AUTO: 3.24 M/UL
WBC # BLD AUTO: 11.65 K/UL

## 2017-12-10 PROCEDURE — 11000001 HC ACUTE MED/SURG PRIVATE ROOM

## 2017-12-10 PROCEDURE — 36415 COLL VENOUS BLD VENIPUNCTURE: CPT

## 2017-12-10 PROCEDURE — 99233 SBSQ HOSP IP/OBS HIGH 50: CPT | Mod: ,,, | Performed by: OBSTETRICS & GYNECOLOGY

## 2017-12-10 PROCEDURE — 85027 COMPLETE CBC AUTOMATED: CPT

## 2017-12-10 PROCEDURE — 85007 BL SMEAR W/DIFF WBC COUNT: CPT

## 2017-12-10 PROCEDURE — 25000003 PHARM REV CODE 250: Performed by: STUDENT IN AN ORGANIZED HEALTH CARE EDUCATION/TRAINING PROGRAM

## 2017-12-10 RX ORDER — FENTANYL CITRATE 50 UG/ML
INJECTION, SOLUTION INTRAMUSCULAR; INTRAVENOUS
Status: DISCONTINUED | OUTPATIENT
Start: 2017-12-09 | End: 2017-12-10

## 2017-12-10 RX ORDER — DOCUSATE SODIUM 100 MG/1
200 CAPSULE, LIQUID FILLED ORAL DAILY
Status: DISCONTINUED | OUTPATIENT
Start: 2017-12-10 | End: 2017-12-11 | Stop reason: HOSPADM

## 2017-12-10 RX ORDER — FERROUS SULFATE 325(65) MG
325 TABLET, DELAYED RELEASE (ENTERIC COATED) ORAL DAILY
Status: DISCONTINUED | OUTPATIENT
Start: 2017-12-10 | End: 2017-12-11 | Stop reason: HOSPADM

## 2017-12-10 RX ADMIN — FERROUS SULFATE TAB EC 325 MG (65 MG FE EQUIVALENT) 325 MG: 325 (65 FE) TABLET DELAYED RESPONSE at 11:12

## 2017-12-10 RX ADMIN — HYDROCORTISONE 2.5%: 25 CREAM TOPICAL at 11:12

## 2017-12-10 RX ADMIN — IBUPROFEN 600 MG: 600 TABLET, FILM COATED ORAL at 10:12

## 2017-12-10 RX ADMIN — HYDROCODONE BITARTRATE AND ACETAMINOPHEN 1 TABLET: 5; 325 TABLET ORAL at 05:12

## 2017-12-10 RX ADMIN — DOCUSATE SODIUM 200 MG: 100 CAPSULE, LIQUID FILLED ORAL at 11:12

## 2017-12-10 RX ADMIN — IBUPROFEN 600 MG: 600 TABLET, FILM COATED ORAL at 02:12

## 2017-12-10 RX ADMIN — IBUPROFEN 600 MG: 600 TABLET, FILM COATED ORAL at 07:12

## 2017-12-10 RX ADMIN — IBUPROFEN 600 MG: 600 TABLET, FILM COATED ORAL at 01:12

## 2017-12-10 NOTE — DISCHARGE INSTRUCTIONS
Breastfeeding Discharge Instructions       Feed the baby at the earliest sign of hunger or comfort  o Hands to mouth, sucking motions  o Rooting or searching for something to suck on  o Dont wait for crying - it is a late sign of hunger and comfort.     The feedings may be 8-12 times per 24hrs and will not follow a schedule   Avoid pacifiers and bottles for the first 4 weeks   Alternate the breast you start the feeding with, or start with the breast that feels the fullest   Switch breasts when the baby takes himself off the breast or falls asleep   Keep offering breasts until the baby looks full, no longer gives hunger signs, and stays asleep when placed on his back in the crib   If the baby is sleepy and wont wake for a feeding, put the baby skin-to-skin dressed in a diaper against the mothers bare chest   Sleep near your baby   The baby should be positioned and latched on to the breast correctly  o Chest-to-chest, chin in the breast  o Babys lips are flipped outward  o Babys mouth is stretched open wide like a shout  o Babys sucking should feel like tugging to the mother  - The baby should be drinking at the breast:  o You should hear swallowing or gulping throughout the feeding  o You should see milk on the babys lips when he comes off the breast  o Your breasts should be softer when the baby is finished feeding  o The baby should look relaxed at the end of feedings  o After the 4th day and your milk is in:  o The babys poop should turn bright yellow and be loose, watery, and seedy  o The baby should have at least 3-4 poops the size of the palm of your hand per day  o The baby should have at least 6-8 wet diapers per day  o The urine should be light yellow in color  You should drink when you are thirsty and eat a healthy diet when you are    hungry.     Take naps to get the rest you need.   Take medications and/or drink alcohol only with permission of your obstetrician    or the babys  pediatrician.  You can also call the [facility],   ([phone]), Monday-Friday, 8am-5pm Central time, to get the most   up-to-date evidence-based information on the use of medications during   pregnancy and breastfeeding.      The baby should be examined by a pediatrician at 3-5 days of age.  Once your   milk comes in, the baby should be gaining at least ½ - 1oz each day and should be back to birthweight no later than 10-14 days of age.

## 2017-12-10 NOTE — SUBJECTIVE & OBJECTIVE
Hospital course: 2017 - Patient admitted to L&D for IOL secondary to oligohydramnios. IOL initiated with cytotec.  2017 - IOL continued with pitocin, AROM, and IUPC. , uncomplicated.   12/10/2017 - PPD#1. Beginning to meet PP milestones. Tolerating diet, pain reasonably controlled. Voiding without difficulty and ambulating.    Interval History:     She is doing well this morning. She is tolerating a regular diet without nausea or vomiting. She is voiding spontaneously. She is ambulating. She has passed flatus, and has not a BM. Vaginal bleeding is mild. She denies fever or chills. Abdominal pain is mild and controlled with oral medications. She is breastfeeding.     Objective:     Vital Signs (Most Recent):  Temp: 98 °F (36.7 °C) (12/10/17 0000)  Pulse: 84 (12/10/17 0000)  Resp: 18 (12/10/17 0000)  BP: 121/72 (12/10/17 0000)  SpO2: 99 % (12/10/17 0000) Vital Signs (24h Range):  Temp:  [96.1 °F (35.6 °C)-98.3 °F (36.8 °C)] 98 °F (36.7 °C)  Pulse:  [] 84  Resp:  [16-20] 18  SpO2:  [97 %-100 %] 99 %  BP: ()/(52-97) 121/72     Weight: 75.8 kg (167 lb)  Body mass index is 28.67 kg/m².      Intake/Output Summary (Last 24 hours) at 12/10/17 0330  Last data filed at 17 1915   Gross per 24 hour   Intake          3389.83 ml   Output             1481 ml   Net          1908.83 ml       Significant Labs:  Lab Results   Component Value Date    GROUPTRH O POS 2017    HEPBSAG Negative 2017    STREPBCULT  2017     STREPTOCOCCUS AGALACTIAE (GROUP B)  Beta-hemolytic streptococci are routinely susceptible to   penicillins,cephalosporins and carbapenems.         Recent Labs  Lab 17  1544   HGB 10.8*   HCT 33.8*       I have personallly reviewed all pertinent lab results from the last 24 hours.    Physical Exam:   Constitutional: She is oriented to person, place, and time. She appears well-developed and well-nourished. No distress.    HENT:   Head: Normocephalic and atraumatic.       Cardiovascular: Normal rate and regular rhythm.     Pulmonary/Chest: Effort normal.        Abdominal: Soft. Bowel sounds are normal. She exhibits no distension. There is no tenderness. There is no rebound and no guarding.     Genitourinary:   Genitourinary Comments: Fundus firm below umbilicus           Musculoskeletal: She exhibits no edema.       Neurological: She is alert and oriented to person, place, and time.    Skin: Skin is warm and dry.    Psychiatric: She has a normal mood and affect.

## 2017-12-10 NOTE — ASSESSMENT & PLAN NOTE
Postpartum care:  - Patient doing well. Continue routine management and advances.  - Continue PO pain meds. Pain well controlled.  - Encourage ambulation.   - Heme: Pre Delivery h/h 11/33 --> Post Delivery h/h pending  - Contraception - **  - Lactation - The patient is breast feeding. Lactation nurse following along PRN  - Rh Status - pos

## 2017-12-10 NOTE — PROGRESS NOTES
[facility]  Obstetrics  Postpartum Progress Note    Patient Name: Sara Arellano  MRN: [id number]  Admission Date: 2017  Hospital Length of Stay: 2 days  Attending Physician: Babs Crockett MD  Primary Care Provider: Primary Doctor No    Subjective:     Principal Problem:Oligohydramnios in third trimester    Hospital course: 2017 - Patient admitted to L&D for IOL secondary to oligohydramnios. IOL initiated with cytotec.  2017 - IOL continued with pitocin, AROM, and IUPC. , uncomplicated.   12/10/2017 - PPD#1. Beginning to meet PP milestones. Tolerating diet, pain reasonably controlled. Voiding without difficulty and ambulating.    Interval History:     She is doing well this morning. She is tolerating a regular diet without nausea or vomiting. She is voiding spontaneously. She is ambulating. She has passed flatus, and has not a BM. Vaginal bleeding is mild. She denies fever or chills. Abdominal pain is mild and controlled with oral medications. She is breastfeeding.     Objective:     Vital Signs (Most Recent):  Temp: 98 °F (36.7 °C) (12/10/17 0000)  Pulse: 84 (12/10/17 0000)  Resp: 18 (12/10/17 0000)  BP: 121/72 (12/10/17 0000)  SpO2: 99 % (12/10/17 0000) Vital Signs (24h Range):  Temp:  [96.1 °F (35.6 °C)-98.3 °F (36.8 °C)] 98 °F (36.7 °C)  Pulse:  [] 84  Resp:  [16-20] 18  SpO2:  [97 %-100 %] 99 %  BP: ()/(52-97) 121/72     Weight: 75.8 kg (167 lb)  Body mass index is 28.67 kg/m².      Intake/Output Summary (Last 24 hours) at 12/10/17 0330  Last data filed at 17 1915   Gross per 24 hour   Intake          3389.83 ml   Output             1481 ml   Net          1908.83 ml       Significant Labs:  Lab Results   Component Value Date    GROUPTRH O POS 2017    HEPBSAG Negative 2017    STREPBCULT  2017     STREPTOCOCCUS AGALACTIAE (GROUP B)  Beta-hemolytic streptococci are routinely susceptible to   penicillins,cephalosporins and  carbapenems.         Recent Labs  Lab 17  1544   HGB 10.8*   HCT 33.8*       I have personallly reviewed all pertinent lab results from the last 24 hours.    Physical Exam:   Constitutional: She is oriented to person, place, and time. She appears well-developed and well-nourished. No distress.    HENT:   Head: Normocephalic and atraumatic.      Cardiovascular: Normal rate and regular rhythm.     Pulmonary/Chest: Effort normal.        Abdominal: Soft. Bowel sounds are normal. She exhibits no distension. There is no tenderness. There is no rebound and no guarding.     Genitourinary:   Genitourinary Comments: Fundus firm below umbilicus           Musculoskeletal: She exhibits no edema.       Neurological: She is alert and oriented to person, place, and time.    Skin: Skin is warm and dry.    Psychiatric: She has a normal mood and affect.       Assessment/Plan:     27 y.o. female  for:     (spontaneous vaginal delivery)    Postpartum care:  - Patient doing well. Continue routine management and advances.  - Continue PO pain meds. Pain well controlled.  - Encourage ambulation.   - Heme: Pre Delivery h/h  --> Post Delivery h/h . Will start Fe supp. VSS, asymptomatic.  - Contraception - undecided  - Lactation - The patient is breast feeding. Lactation nurse following along PRN  - Rh Status - pos                Disposition: As patient meets milestones, will plan to discharge PPD 2.    Lillian Gore MD  Obstetrics  [facility]    Doing well, no questions,no problems.  I have reviewed the resident's note, evaluated the patient and agree with the diagnosis and management plan

## 2017-12-10 NOTE — SIGNIFICANT EVENT
Called to room at 39 minutes of life due to oxygen desaturations. Infant under radiant warmer being given blow by oxygen at time of arrival. Temperature 95.9. Blow by oxygen discontinued and infant placed on warming mattress. Repeat temperature of 96.4 after 10 minutes. Infant able to maintain oxygen saturations above 96% on room air. Instructed RN to monitor temperature closely until infant's temperature is within normal range.

## 2017-12-10 NOTE — LACTATION NOTE
"Seen pt for lactation counseling. Pt using nipple shield as preferred, she said her nipples were flat and used it for her first baby. Pt showed me how baby latches and baby suckles for 3-5x and slept. Nipple everts on shields and milk noted on shields. Encouraged to do skin to skin care. 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 Pt declined further assistance and just gave lactation number to call.     12/10/17 1650   Maternal Infant Assessment   Breast Shape Bilateral:;pendulous   [place] Right:;elastic;other (see comments)  (with nipple shield on)   Nipple(s) Bilateral:;other (see comments)  (using nipple shield pt verbalzied she has flat nipples)   Infant Assessment   Sucking Reflex present   LATCH Score   Latch 0-->too sleepy or reluctant, no latch achieved  (suckled 3-5x then stopped)   Maternal Infant Feeding   Maternal Emotional State independent   Time Spent (min) 0-15 min   Breastfeeding Education adequate infant intake;importance of skin-to-skin contact;other (see comments)  (pt declines further education)   Feeding Infant   Skin-to-Skin Contact During Feeding no   Lactation Referrals   Lactation Consult Breastfeeding assessment;Initial assessment   Lactation Interventions   Maternal Breastfeeding Support lactation counseling provided  (brief discussion as pt was not interested to listen further)     "

## 2017-12-10 NOTE — ANESTHESIA POSTPROCEDURE EVALUATION
"Anesthesia Post Evaluation    Patient: Sara Arellano    Procedure(s) Performed: * No procedures listed *    Final Anesthesia Type: epidural  Patient location during evaluation: floor  Patient participation: Yes- Able to Participate  Level of consciousness: awake and alert and oriented  Post-procedure vital signs: reviewed and stable  Pain management: adequate  Airway patency: patent  PONV status at discharge: No PONV  Anesthetic complications: no      Cardiovascular status: blood pressure returned to baseline and hemodynamically stable  Respiratory status: unassisted, spontaneous ventilation and room air  Hydration status: euvolemic  Follow-up not needed.        Visit Vitals  /87 (BP Location: Left arm, Patient Position: Sitting)   Pulse 89   Temp 36.8 °C (98.3 °F) (Oral)   Resp 18   Ht 5' 4" (1.626 m)   Wt 75.8 kg (167 lb)   LMP 04/11/2017 (Approximate)   SpO2 100%   Breastfeeding? Yes   BMI 28.67 kg/m²       Pain/Oleg Score: Pain Rating Prior to Med Admin: 6 (12/10/2017  7:23 AM)  Pain Rating Post Med Admin: 0 (12/10/2017  2:20 AM)      "

## 2017-12-11 VITALS
HEIGHT: 64 IN | TEMPERATURE: 98 F | SYSTOLIC BLOOD PRESSURE: 113 MMHG | DIASTOLIC BLOOD PRESSURE: 64 MMHG | BODY MASS INDEX: 28.51 KG/M2 | RESPIRATION RATE: 18 BRPM | WEIGHT: 167 LBS | OXYGEN SATURATION: 99 % | HEART RATE: 81 BPM

## 2017-12-11 PROBLEM — O41.03X0 OLIGOHYDRAMNIOS IN THIRD TRIMESTER: Status: RESOLVED | Noted: 2017-12-11 | Resolved: 2017-12-10

## 2017-12-11 PROBLEM — B95.1 GROUP B STREPTOCOCCAL INFECTION DURING PREGNANCY: Status: RESOLVED | Noted: 2017-12-11 | Resolved: 2017-12-10

## 2017-12-11 PROBLEM — O98.819 GROUP B STREPTOCOCCAL INFECTION DURING PREGNANCY: Status: RESOLVED | Noted: 2017-12-11 | Resolved: 2017-12-10

## 2017-12-11 PROCEDURE — 90686 IIV4 VACC NO PRSV 0.5 ML IM: CPT | Performed by: OBSTETRICS & GYNECOLOGY

## 2017-12-11 PROCEDURE — 63600175 PHARM REV CODE 636 W HCPCS: Performed by: OBSTETRICS & GYNECOLOGY

## 2017-12-11 PROCEDURE — 25000003 PHARM REV CODE 250: Performed by: STUDENT IN AN ORGANIZED HEALTH CARE EDUCATION/TRAINING PROGRAM

## 2017-12-11 PROCEDURE — 90471 IMMUNIZATION ADMIN: CPT | Performed by: OBSTETRICS & GYNECOLOGY

## 2017-12-11 PROCEDURE — 99238 HOSP IP/OBS DSCHRG MGMT 30/<: CPT | Mod: ,,, | Performed by: OBSTETRICS & GYNECOLOGY

## 2017-12-11 RX ORDER — IBUPROFEN 600 MG/1
600 TABLET ORAL EVERY 6 HOURS
Qty: 30 TABLET | Refills: 1 | Status: SHIPPED | OUTPATIENT
Start: 2017-12-11 | End: 2017-12-27 | Stop reason: SDUPTHER

## 2017-12-11 RX ADMIN — IBUPROFEN 600 MG: 600 TABLET, FILM COATED ORAL at 12:12

## 2017-12-11 RX ADMIN — IBUPROFEN 600 MG: 600 TABLET, FILM COATED ORAL at 05:12

## 2017-12-11 RX ADMIN — DOCUSATE SODIUM 200 MG: 100 CAPSULE, LIQUID FILLED ORAL at 09:12

## 2017-12-11 RX ADMIN — INFLUENZA A VIRUS A/MICHIGAN/45/2015 X-275 (H1N1) ANTIGEN (FORMALDEHYDE INACTIVATED), INFLUENZA A VIRUS A/HONG KONG/4801/2014 X-263B (H3N2) ANTIGEN (FORMALDEHYDE INACTIVATED), INFLUENZA B VIRUS B/PHUKET/3073/2013 ANTIGEN (FORMALDEHYDE INACTIVATED), AND INFLUENZA B VIRUS B/BRISBANE/60/2008 ANTIGEN (FORMALDEHYDE INACTIVATED) 0.5 ML: 15; 15; 15; 15 INJECTION, SUSPENSION INTRAMUSCULAR at 12:12

## 2017-12-11 RX ADMIN — FERROUS SULFATE TAB EC 325 MG (65 MG FE EQUIVALENT) 325 MG: 325 (65 FE) TABLET DELAYED RESPONSE at 09:12

## 2017-12-11 NOTE — PHYSICIAN QUERY
PT Name: Sara Arellano  MR #: [id number]     Physician Query Form - Documentation Clarification      CDS/: ALEXY Reid,RNC-MNN           Contact information:[email or web address]    This form is a permanent document in the medical record.     Query Date: 2017    By submitting this query, we are merely seeking further clarification of documentation. Please utilize your independent clinical judgment when addressing the question(s) below.    The Medical record reflects the following:    Supporting Clinical Findings Location in Medical Record   This IUP is complicated by GBS + Status    Group B streptococcal infection during pregnancy  -Penicillin     cephalic with 1 contraction. H&P           L&D Delivery note                                                                                       Doctor, Please specify diagnosis or diagnoses associated with above clinical findings.    Provider Use Only      [ x ] Group beta strep carrier state only complicating childbirth  [  ] Group beta strep infection, please specify infection type:_____________________________________________  [  ] Other, please specify:______________________________                                                                                                                   [  ] Clinically undetermined

## 2017-12-11 NOTE — PLAN OF CARE
Problem: Patient Care Overview  Goal: Plan of Care Review  Outcome: Ongoing (interventions implemented as appropriate)  Instructed on cue based breast feeding, including:   Feed your baby only breast milk for the first 6 months per AAP guidelines.   Feed your baby at the earliest sign of hunger or comfort:  o Sucking on fingers or hands  o Bringing hands toward his mouth  o Rooting or reaching for something to suck on  o Sucking motions with mouth  o Fretful noises  o Crying is a sign of distress, not hunger   The baby should be positioned and latched on to the breast correctly  o Chest-to-chest, chin in the breast  o Babys lips are flipped outward  o Babys mouth is stretched open wide like a shout  o Babys sucking should feel like tugging to the mother  - The baby should be drinking at the breast  o You should hear an occasional swallow during the feeding  o Switch breasts when the baby takes himself off the breast or falls asleep  o Keep offering breasts until the baby looks full, no longer gives hunger signs, and stays asleep when placed on his back in the crib  - If the baby is sleepy and wont wake for a feeding, put the baby skin-to-skin dressed in a diaper against the mothers bare chest  - Sleep with your baby near you in the hospital room  - Call the nurse/lactation consultant for additional assistance as needed.  Pt states understanding and verbalized appropriate recall.

## 2017-12-11 NOTE — PHYSICIAN QUERY
"PT Name: Sara Arellano  MR #: [id number]    Physician Query Form - Hematology Clarification      CDS/: ALEXY Reid,RNC-MNN           Contact information:[email or web address]    This form is a permanent document in the medical record.      Query Date: 2017    By submitting this query, we are merely seeking further clarification of documentation. Please utilize your independent clinical judgment when addressing the question(s) below.    The Medical record contains the following:   Indicators  Supporting Clinical Findings Location in Medical Record   X "Anemia" documented Sara Arellano is a 27 y.o. female  @ 37w0d w/ h/o tobacco abuse and anemia here for IOL Anesthesia note    X H & H = Hgb=9.4-10.8  Hct=29.3-33.8 LAB -2/10    BP =                     HR=      "GI bleeding" documented     X Acute bleeding (Non GI site)  cc L&D Delivery note     Transfusion(s)     X Treatment: ferrous sulfate EC tablet 325 mg  MAR 12/10-   X Other:   cephalic with 1 contraction L&D Delivery note      Provider, please specify diagnosis or diagnoses associated with above clinical findings.    [ x ] Acute blood loss anemia  [ x ] Iron deficiency anemia  [  ] Other Hematological Diagnosis (please specify): _________________________________  [  ] Clinically Undetermined       Please document in your progress notes daily for the duration of treatment, until resolved, and include in your discharge summary.                                                                                                      "

## 2017-12-11 NOTE — PROGRESS NOTES
[facility]  Obstetrics  Postpartum Progress Note    Patient Name: Sara Arellano  MRN: [id number]  Admission Date: 2017  Hospital Length of Stay: 3 days  Attending Physician: Babs Crockett MD  Primary Care Provider: Primary Doctor No    Subjective:     Principal Problem: (spontaneous vaginal delivery)    Hospital course: 2017 - Patient admitted to L&D for IOL secondary to oligohydramnios. IOL initiated with cytotec. GBS +  2017 - IOL continued with pitocin, AROM, and IUPC, with GBS ppx. , uncomplicated.   12/10/2017 - PPD#1. Beginning to meet PP milestones. Tolerating diet, pain reasonably controlled. Voiding without difficulty and ambulating.  2017 - PPD #2 s/p , doing well and has met PP milestones. Fundus is firm and well below umbilicus.     Interval History:     She is doing well this morning. She is tolerating a regular diet without nausea or vomiting. She is voiding spontaneously. She is ambulating. She has passed flatus, and has not a BM. Vaginal bleeding is mild. She denies fever or chills. Abdominal pain is mild and controlled with oral medications. She is breastfeeding.     Objective:     Vital Signs (Most Recent):  Temp: 97.8 °F (36.6 °C) (17 0344)  Pulse: 79 (17 0344)  Resp: 18 (17 0344)  BP: 111/60 (17 0344)  SpO2: 99 % (17 0344) Vital Signs (24h Range):  Temp:  [97.5 °F (36.4 °C)-98.3 °F (36.8 °C)] 97.8 °F (36.6 °C)  Pulse:  [72-80] 79  Resp:  [18-20] 18  SpO2:  [98 %-99 %] 99 %  BP: (111-133)/(60-90) 111/60     Weight: 75.8 kg (167 lb)  Body mass index is 28.67 kg/m².    No intake or output data in the 24 hours ending 17 0851    Significant Labs:  Lab Results   Component Value Date    GROUPTRH O POS 2017    HEPBSAG Negative 2017    STREPBCULT  2017     STREPTOCOCCUS AGALACTIAE (GROUP B)  Beta-hemolytic streptococci are routinely susceptible to   penicillins,cephalosporins and  carbapenems.         Recent Labs  Lab 12/10/17  0501   HGB 9.4*   HCT 29.3*       I have personallly reviewed all pertinent lab results from the last 24 hours.    Physical Exam:   Constitutional: She is oriented to person, place, and time. She appears well-developed and well-nourished. No distress.    HENT:   Head: Normocephalic and atraumatic.    Eyes: Conjunctivae and EOM are normal.    Neck: Neck supple.    Cardiovascular: Normal rate and regular rhythm.     Pulmonary/Chest: Effort normal.        Abdominal: Soft. Bowel sounds are normal. She exhibits no distension. There is tenderness (minimal). There is no rebound and no guarding.   Fundus firm, displaced to the L, and well below umbilicus.     Genitourinary:   Genitourinary Comments: Fundus firm below umbilicus           Musculoskeletal: She exhibits no edema.       Neurological: She is alert and oriented to person, place, and time.    Skin: Skin is warm and dry.    Psychiatric: She has a normal mood and affect.       Assessment/Plan:     27 y.o. female  for:    *  (spontaneous vaginal delivery)    Postpartum care:  - Patient doing well. Continue routine management and advances.  - Continue PO pain meds. Pain well controlled.  - Encourage ambulation.   - Heme: Pre Delivery h/h  --> Post Delivery  pending  - Contraception - Desires depo but wants to wait until later in PP course - she has already discussed with Dr. Crockett.   - Lactation - The patient is breast feeding. Lactation nurse following along PRN  - Rh Status - pos              Disposition: As patient has met all PP milestones, will plan to discharge today.    Sergio Jacobson MD  Obstetrics  [facility]

## 2017-12-11 NOTE — SUBJECTIVE & OBJECTIVE
Hospital course: 2017 - Patient admitted to L&D for IOL secondary to oligohydramnios. IOL initiated with cytotec. GBS +  2017 - IOL continued with pitocin, AROM, and IUPC, with GBS ppx. , uncomplicated.   12/10/2017 - PPD#1. Beginning to meet PP milestones. Tolerating diet, pain reasonably controlled. Voiding without difficulty and ambulating.  2017 - PPD #2 s/p , doing well and has met PP milestones. Fundus is firm and well below umbilicus.     Interval History:     She is doing well this morning. She is tolerating a regular diet without nausea or vomiting. She is voiding spontaneously. She is ambulating. She has passed flatus, and has not a BM. Vaginal bleeding is mild. She denies fever or chills. Abdominal pain is mild and controlled with oral medications. She is breastfeeding.     Objective:     Vital Signs (Most Recent):  Temp: 97.8 °F (36.6 °C) (17 034)  Pulse: 79 (17 034)  Resp: 18 (17 034)  BP: 111/60 (17 034)  SpO2: 99 % (17 034) Vital Signs (24h Range):  Temp:  [97.5 °F (36.4 °C)-98.3 °F (36.8 °C)] 97.8 °F (36.6 °C)  Pulse:  [72-80] 79  Resp:  [18-20] 18  SpO2:  [98 %-99 %] 99 %  BP: (111-133)/(60-90) 111/60     Weight: 75.8 kg (167 lb)  Body mass index is 28.67 kg/m².    No intake or output data in the 24 hours ending 17 0851    Significant Labs:  Lab Results   Component Value Date    GROUPTRH O POS 2017    HEPBSAG Negative 2017    STREPBCULT  2017     STREPTOCOCCUS AGALACTIAE (GROUP B)  Beta-hemolytic streptococci are routinely susceptible to   penicillins,cephalosporins and carbapenems.         Recent Labs  Lab 12/10/17  0501   HGB 9.4*   HCT 29.3*       I have personallly reviewed all pertinent lab results from the last 24 hours.    Physical Exam:   Constitutional: She is oriented to person, place, and time. She appears well-developed and well-nourished. No distress.    HENT:   Head: Normocephalic and atraumatic.     Eyes: Conjunctivae and EOM are normal.    Neck: Neck supple.    Cardiovascular: Normal rate and regular rhythm.     Pulmonary/Chest: Effort normal.        Abdominal: Soft. Bowel sounds are normal. She exhibits no distension. There is tenderness (minimal). There is no rebound and no guarding.   Fundus firm, displaced to the L, and well below umbilicus.     Genitourinary:   Genitourinary Comments: Fundus firm below umbilicus           Musculoskeletal: She exhibits no edema.       Neurological: She is alert and oriented to person, place, and time.    Skin: Skin is warm and dry.    Psychiatric: She has a normal mood and affect.

## 2017-12-11 NOTE — LACTATION NOTE
"Lactation note:  Mother declined lactation assistance yesterday. Spoke with her primary nurse, Antonette, who states she told her nurse last night that she did not need lactation assistance as she "taught herself" what she needs to know related to breastfeeding. RN knows mother may call as needed for assistance with breastfeeding.  "

## 2017-12-11 NOTE — DISCHARGE SUMMARY
"[facility]  Obstetrics  Discharge Summary      Patient Name: Sara Arellano  MRN: [id number]  Admission Date: 2017  Hospital Length of Stay: 3 days  Discharge Date and Time:  2017 7:04 AM  Attending Physician: Babs Crockett MD   Discharging Provider: Bon Vaughn MD  Primary Care Provider: Primary Doctor No    HPI: Sara Arellano is a 27 y.o.  at 37w0d who presented to the OB ED complaining of back pains since this morning. The patient stated that her current complaints are similar to what she felt during her first delivery.  She stated that she had little to no labor pains during her first delivery and was concerned that it could happen again.  The patient stated that she may have lost her mucous plug given the discharge she noted overnight.  She describes the discharge as small in volume and as "blood-tinged" in nature. She denied any margarito bleeding.  She stated that fetal movements have been active and persistent. On evaluation in the OB ED she was found to 1/50/-3 with a reactive, category 1 NST.  Evaluation for ruptured membranes was negative for pooling, Nitrazine, and ferning.  An ultrasound at bedside demonstrated no measurable pockets, raising concerns for oligohydramnios. Given her gestational age and lack of amniotic fluid, the decision was made to induce labor.      This IUP is complicated by GBS + Status.    * No surgery found *     Hospital Course:   2017 - Patient admitted to L&D for IOL secondary to oligohydramnios. IOL initiated with cytotec.  2017 - IOL continued with pitocin, AROM, and IUPC. , uncomplicated.   12/10/2017 - PPD#1. Beginning to meet PP milestones. Tolerating diet, pain reasonably controlled. Voiding without difficulty and ambulating.    Consults         Status Ordering Provider     Consult to Lactation  Use PRN     Provider:  (Not yet assigned)    Acknowledged ALVARO LEBRON          Final Active Diagnoses: "    Diagnosis Date Noted POA    PRINCIPAL PROBLEM:   (spontaneous vaginal delivery) [O80] 2017 Not Applicable      Problems Resolved During this Admission:    Diagnosis Date Noted Date Resolved POA    Labor, false ([place]), antepartum [O47.9] 2017 Yes    Oligohydramnios in third trimester [O41.03X0] 2017 12/10/2017 Unknown    Indication for care in labor and delivery, antepartum [O75.9] 2017 12/10/2017 Unknown    Group B streptococcal infection during pregnancy [O98.819, B95.1] 2017 12/10/2017 Unknown        Labs:   CBC   Recent Labs  Lab 12/10/17  0501   WBC 11.65   HGB 9.4*   HCT 29.3*          Feeding Method: breast    Immunizations     Date Immunization Status Dose Route/Site Given by    17 1754 Influenza - Quadrivalent - PF (3 years & older) Deleted 0.5 mL Intramuscular/Left deltoid     17 MMR Incomplete 0.5 mL Subcutaneous/Left deltoid     17 Tdap Incomplete 0.5 mL Intramuscular/Left deltoid     12/10/17 0824 Influenza - Quadrivalent - PF (3 years & older) Incomplete 0.5 mL Intramuscular/Left deltoid           Delivery:    Episiotomy: None   Lacerations: 1st   Repair suture: None   Repair # of packets: 0   Blood loss (ml): 281     Birth information:  YOB: 2016   Time of birth: 5:21 PM   Sex: female   Delivery type: Vaginal, Spontaneous Delivery   Gestational Age: 37w1d    Delivery Clinician:      Other providers:       Additional  information:  Forceps:    Vacuum:    Breech:    Observed anomalies      Living?:           APGARS  One minute Five minutes Ten minutes   Skin color:         Heart rate:         Grimace:         Muscle tone:         Breathing:         Totals: 8  9        Placenta: Delivered:       appearance    Pending Diagnostic Studies:     None          Discharged Condition: good    Disposition: Home or Self Care    Follow Up:  Follow-up Information     Babs Crockett MD. Schedule an  appointment as soon as possible for a visit in 6 weeks.    Specialty:  Obstetrics and Gynecology  Why:  Post Partum Appointment  Contact information:  [address] [facility] [address] [phone]                 Patient Instructions:     Diet general     Activity as tolerated     Other restrictions (specify):   Order Comments: Nothing in the vagina for 6 weeks     Call MD for:  temperature >100.4     Call MD for:  persistent nausea and vomiting or diarrhea     Call MD for:  severe uncontrolled pain     Call MD for:  difficulty breathing or increased cough     Call MD for:  severe persistent headache     Call MD for:  worsening rash     Call MD for:  persistent dizziness, light-headedness, or visual disturbances     Call MD for:  increased confusion or weakness     Call MD for:   Order Comments: --Vaginal bleeding more than 1 pad/hour for more than 2 hours  --Overwhelming feelings of anxiety or sadness greater than 50% of the time       Medications:  Current Discharge Medication List      START taking these medications    Details   ibuprofen (ADVIL,MOTRIN) 600 MG tablet Take 1 tablet (600 mg total) by mouth every 6 (six) hours.  Qty: 30 tablet, Refills: 1         CONTINUE these medications which have NOT CHANGED    Details   folic acid (FOLVITE) 1 MG tablet Take 1 mg by mouth once daily.      PRENATAL VIT/IRON FUMARATE/FA (PRENATAL 1+1 ORAL) Take by mouth.             Bon Vaughn MD  Obstetrics  [facility]

## 2017-12-11 NOTE — ASSESSMENT & PLAN NOTE
Postpartum care:  - Patient doing well. Continue routine management and advances.  - Continue PO pain meds. Pain well controlled.  - Encourage ambulation.   - Heme: Pre Delivery h/h 11/33 --> Post Delivery 9/29 pending  - Contraception - Desires depo but wants to wait until later in PP course - she has already discussed with Dr. Crockett.   - Lactation - The patient is breast feeding. Lactation nurse following along PRN  - Rh Status - pos

## 2017-12-12 ENCOUNTER — TELEPHONE (OUTPATIENT)
Dept: OBSTETRICS AND GYNECOLOGY | Facility: CLINIC | Age: 27
End: 2017-12-12

## 2017-12-27 RX ORDER — IBUPROFEN 600 MG/1
600 TABLET ORAL EVERY 6 HOURS
Qty: 30 TABLET | Refills: 0 | Status: SHIPPED | OUTPATIENT
Start: 2017-12-27 | End: 2018-01-22 | Stop reason: SDUPTHER

## 2018-01-19 ENCOUNTER — TELEPHONE (OUTPATIENT)
Dept: OBSTETRICS AND GYNECOLOGY | Facility: CLINIC | Age: 28
End: 2018-01-19

## 2018-01-19 NOTE — TELEPHONE ENCOUNTER
Called patient no answer.Left voicemail letting patient know that  will not be in office on Monday 1/22/18 . We can reschedule appointment for Tuesday and Wednesday. Give our office a call back.

## 2018-01-19 NOTE — TELEPHONE ENCOUNTER
Called patient to rescheduled appointment no answer left voicemail for patient to give our office a call back. Also to patient that she can rescheduled appointment for Tuesday for the same time

## 2018-01-22 ENCOUNTER — POSTPARTUM VISIT (OUTPATIENT)
Dept: OBSTETRICS AND GYNECOLOGY | Facility: CLINIC | Age: 28
End: 2018-01-22
Payer: MEDICAID

## 2018-01-22 VITALS
SYSTOLIC BLOOD PRESSURE: 110 MMHG | HEIGHT: 64 IN | BODY MASS INDEX: 25.06 KG/M2 | DIASTOLIC BLOOD PRESSURE: 70 MMHG | WEIGHT: 146.81 LBS

## 2018-01-22 DIAGNOSIS — Z30.09 ENCOUNTER FOR OTHER GENERAL COUNSELING AND ADVICE ON CONTRACEPTION: ICD-10-CM

## 2018-01-22 PROCEDURE — 99999 PR PBB SHADOW E&M-EST. PATIENT-LVL III: CPT | Mod: PBBFAC,,, | Performed by: NURSE PRACTITIONER

## 2018-01-22 PROCEDURE — 99213 OFFICE O/P EST LOW 20 MIN: CPT | Mod: PBBFAC | Performed by: NURSE PRACTITIONER

## 2018-01-22 PROCEDURE — 0503F POSTPARTUM CARE VISIT: CPT | Mod: ,,, | Performed by: NURSE PRACTITIONER

## 2018-01-22 PROCEDURE — 87480 CANDIDA DNA DIR PROBE: CPT

## 2018-01-22 RX ORDER — IBUPROFEN 600 MG/1
600 TABLET ORAL EVERY 6 HOURS
Qty: 30 TABLET | Refills: 0 | Status: ON HOLD | OUTPATIENT
Start: 2018-01-22 | End: 2021-09-11

## 2018-01-22 NOTE — PROGRESS NOTES
Postpartum Visit  Sara Arellano is a 27 y.o. female  is here for a postpartum visit. She is 6 weeks postpartum following a spontaneous vaginal delivery, of a female infant weighin g, with Anesthesia: epidural. The delivery was at 37w1d.  Doing well. Infant gaining weight and eating well. Breastfeeding. No problems today. Interested in her options for birth control. Pt has had intercourse since delivery but used condoms. Interested in something that she does not have to remember to do anything and that allows her to still have a cycle. C/o brown discharge with an odor today. Still having cramping with breastfeeding. Ran out of anaprox-requesting refill.     Pregnancy was complicated by: hx ovarian cyst, hx HPV +.      OB History    Para Term  AB Living   2 2 2 0 0 2   SAB TAB Ectopic Multiple Live Births   0 0 0 0 2      # Outcome Date GA Lbr Roman/2nd Weight Sex Delivery Anes PTL Lv   2 Term 17 37w1d / 00:06 2.296 kg (5 lb 1 oz) F Vag-Spont EPI N FLORENTINO   1 Term 16 38w4d / 01:20 3.12 kg (6 lb 14.1 oz) F Vag-Spont EPI N FLORENTINO          Postpartum course has been uncomplicated.  Bleeding no bleeding. Bowel/ bladder function is normal. Her last pap was 2017 WNL.  Patient is not sexually active. Desired contraception method is paragaurd.   Postpartum depression screening: negative.      Baby's course has been uncomplicated. Baby is feeding by breast.     ROS:  GENERAL: No fever, chills, fatigability.  VULVAR: No pain, no lesions and no itching.  VAGINAL: No relaxation, no itching, no discharge, no abnormal bleeding and no lesions.  ABDOMEN: No abdominal pain. Denies nausea. Denies vomiting. No diarrhea. No constipation  BREAST: Denies pain. No lumps. No discharge.  URINARY: No incontinence, no nocturia, no frequency and no dysuria.  CARDIOVASCULAR: No chest pain. No shortness of breath. No leg cramps.  NEUROLOGICAL: No headaches. No vision changes.      General appearance - alert,  well appearing, and in no distress, oriented to person, place, and time and normal appearing weight  Mental status - alert, oriented to person, place, and time, normal mood, behavior, speech, dress, motor activity, and thought processes  Skin - coloration normal for race, good turgor, warm to touch, no rashes  Abdomen - soft, nontender, nondistended, no masses or organomegaly    Pelvic -   External genitalia postpartum: normal, well-healed, without lesions or masses.  Normal female hair distribution. Adequate perineal body. Urethral meatus without lesions or prolapse. Urethra: no masses, tenderness, or scarring.  Bladder: without tenderness or masses.  Vaginal mucosa moist and pink, normal rugae, without lesions, abnormal discharge, or foul odor.  Cervix pink, no lesions, no cervical motion tenderness.  Uterus: midline, non tender, smooth, not enlarged, not prolapsed  No adnexal masses or tenderness.  Extremities - no edema, redness or tenderness in the calves or thighs      Sara was seen today for postpartum follow-up.    Diagnoses and all orders for this visit:    Postpartum care following vaginal delivery    Encounter for other general counseling and advice on contraception        Discussed contraception - pt desires IUD  ParaGard pre-authorization initiated today  Affirm pending  Counseling regarding resuming normal activities of exercise and work.  Postpartum precautions reviewed  Anaprox refilled    Routine follow up in 1 yr

## 2018-01-23 LAB
CANDIDA RRNA VAG QL PROBE: NEGATIVE
G VAGINALIS RRNA GENITAL QL PROBE: NEGATIVE
T VAGINALIS RRNA GENITAL QL PROBE: NEGATIVE

## 2018-01-24 ENCOUNTER — TELEPHONE (OUTPATIENT)
Dept: OBSTETRICS AND GYNECOLOGY | Facility: CLINIC | Age: 28
End: 2018-01-24

## 2018-01-24 NOTE — TELEPHONE ENCOUNTER
----- Message from Brittanie Carballo NP sent at 1/23/2018  8:41 AM CST -----  Please notify pt-    Your vaginosis culture came back negative. You do not have yeast, bacterial vaginosis, or trichomonas. Please call with any questions or concerns.     Best,   Brittanie Carballo NP

## 2018-01-24 NOTE — TELEPHONE ENCOUNTER
Informed patient her vaginosis culture came back negative. You do not have yeast, bacterial vaginosis, or trichomonas. Please call with any questions or concerns. Patient verbalized understanding.

## 2018-02-16 ENCOUNTER — TELEPHONE (OUTPATIENT)
Dept: OBSTETRICS AND GYNECOLOGY | Facility: CLINIC | Age: 28
End: 2018-02-16

## 2018-02-16 NOTE — TELEPHONE ENCOUNTER
Informed pt that her paragard was covered and is here in the office. Pt also schedule for insertion on 02/27 with Brittanie Carballo.pt verbalized understanding.

## 2018-02-26 ENCOUNTER — TELEPHONE (OUTPATIENT)
Dept: OBSTETRICS AND GYNECOLOGY | Facility: CLINIC | Age: 28
End: 2018-02-26

## 2018-02-26 RX ORDER — MISOPROSTOL 200 UG/1
200 TABLET ORAL EVERY 12 HOURS
Qty: 2 TABLET | Refills: 0 | Status: ON HOLD | OUTPATIENT
Start: 2018-02-26 | End: 2021-09-11

## 2018-02-26 NOTE — TELEPHONE ENCOUNTER
----- Message from Brittanie Carballo NP sent at 2/26/2018  9:33 AM CST -----  Regarding: IUD  Is this pt going to be on her cycle tomorrow and did she get the cytotec? I need to know this before scheduling her.

## 2018-02-26 NOTE — TELEPHONE ENCOUNTER
Spoke with pt. Pt stated the she is currently on her period and also I stated that the NP will send in cytotec will be sent to her pharmacy. Pt verbalized understanding.

## 2018-02-27 ENCOUNTER — PROCEDURE VISIT (OUTPATIENT)
Dept: OBSTETRICS AND GYNECOLOGY | Facility: CLINIC | Age: 28
End: 2018-02-27
Payer: MEDICAID

## 2018-02-27 VITALS
BODY MASS INDEX: 24.52 KG/M2 | WEIGHT: 142.88 LBS | SYSTOLIC BLOOD PRESSURE: 116 MMHG | DIASTOLIC BLOOD PRESSURE: 70 MMHG

## 2018-02-27 DIAGNOSIS — Z30.430 ENCOUNTER FOR IUD INSERTION: Primary | ICD-10-CM

## 2018-02-27 LAB
B-HCG UR QL: NEGATIVE
CTP QC/QA: YES

## 2018-02-27 PROCEDURE — 81025 URINE PREGNANCY TEST: CPT | Mod: PBBFAC | Performed by: NURSE PRACTITIONER

## 2018-02-27 PROCEDURE — 58300 INSERT INTRAUTERINE DEVICE: CPT | Mod: S$PBB,,, | Performed by: NURSE PRACTITIONER

## 2018-02-27 PROCEDURE — 58300 INSERT INTRAUTERINE DEVICE: CPT | Mod: PBBFAC | Performed by: NURSE PRACTITIONER

## 2018-02-27 PROCEDURE — 87491 CHLMYD TRACH DNA AMP PROBE: CPT

## 2018-02-27 RX ORDER — COPPER 313.4 MG/1
INTRAUTERINE DEVICE INTRAUTERINE
Status: ON HOLD | COMMUNITY
Start: 2018-02-14 | End: 2021-09-11

## 2018-02-27 NOTE — PROCEDURES
Procedures   Sara Arellano is a 27 y.o. female  presents for IUD placement.  Patient's last menstrual period was 2018..  She desires ParaGard.  UPT is negative.      She was counseled on the risks, benefits, indications, and alternatives to IUD use.  She understands that with insertion there is a risk of bleeding, infection, and uterine perforation.  All questions are answered.  Consents signed.  Cervical cultures were performed.    Procedure:  Time out performed.  The cervix was visualized with a speculum.  A single tooth tenaculum was placed on the anterior lip of the cervix.  The uterus sounds to 7cm using sterile technique.  A ParaGard was loaded and placed high in the uterine fundus without difficulty using sterile technique.  The strings were then trimmed.  The tenaculum and speculum were removed.  The patient tolerated the procedure well.    Assessment:  1.  Contraceptive management/IUD insertion    Post IUD placement counseling:  Manage post IUD placement pain with NSAIDS, Tylenol or Rx per Medcard.  IUD danger signs and how to check for strings were discussed.  The IUD needs to be removed in 5 years for Mirena and 10 years for Copper IUD.  Dr. Crockett was in the room for the entire procedure    Counseling lasted approximately 15 minutes and all her questions were answered.    Follow up:  4 weeks.

## 2018-03-01 LAB
C TRACH DNA SPEC QL NAA+PROBE: NOT DETECTED
N GONORRHOEA DNA SPEC QL NAA+PROBE: NOT DETECTED

## 2018-04-02 RX ADMIN — COPPER 1 EACH: 313.4 INTRAUTERINE DEVICE INTRAUTERINE at 09:04

## 2019-05-04 ENCOUNTER — HOSPITAL ENCOUNTER (EMERGENCY)
Facility: HOSPITAL | Age: 29
Discharge: HOME | End: 2019-05-04
Attending: EMERGENCY MEDICINE
Payer: MEDICAID

## 2019-05-04 VITALS
RESPIRATION RATE: 16 BRPM | SYSTOLIC BLOOD PRESSURE: 105 MMHG | OXYGEN SATURATION: 100 % | WEIGHT: 128 LBS | HEART RATE: 90 BPM | HEIGHT: 64 IN | TEMPERATURE: 99 F | DIASTOLIC BLOOD PRESSURE: 64 MMHG | BODY MASS INDEX: 21.85 KG/M2

## 2019-05-04 DIAGNOSIS — N83.202 OVARIAN CYST, LEFT: Primary | ICD-10-CM

## 2019-05-04 DIAGNOSIS — R55 SYNCOPE: ICD-10-CM

## 2019-05-04 DIAGNOSIS — R10.32 INTERMITTENT LEFT LOWER QUADRANT ABDOMINAL PAIN: ICD-10-CM

## 2019-05-04 DIAGNOSIS — R55 SYNCOPE AND COLLAPSE: ICD-10-CM

## 2019-05-04 LAB
ALBUMIN SERPL BCP-MCNC: 4.2 G/DL (ref 3.5–5.2)
ALP SERPL-CCNC: 52 U/L (ref 55–135)
ALT SERPL W/O P-5'-P-CCNC: 10 U/L (ref 10–44)
ANION GAP SERPL CALC-SCNC: 12 MMOL/L (ref 8–16)
AST SERPL-CCNC: 11 U/L (ref 10–40)
B-HCG UR QL: NEGATIVE
BACTERIA #/AREA URNS AUTO: ABNORMAL /HPF
BASOPHILS # BLD AUTO: 0.05 K/UL (ref 0–0.2)
BASOPHILS NFR BLD: 0.5 % (ref 0–1.9)
BILIRUB SERPL-MCNC: 0.3 MG/DL (ref 0.1–1)
BILIRUB UR QL STRIP: NEGATIVE
BUN SERPL-MCNC: 18 MG/DL (ref 6–20)
CALCIUM SERPL-MCNC: 9.9 MG/DL (ref 8.7–10.5)
CHLORIDE SERPL-SCNC: 109 MMOL/L (ref 95–110)
CLARITY UR REFRACT.AUTO: ABNORMAL
CO2 SERPL-SCNC: 21 MMOL/L (ref 23–29)
COLOR UR AUTO: YELLOW
CREAT SERPL-MCNC: 0.8 MG/DL (ref 0.5–1.4)
CTP QC/QA: YES
DIFFERENTIAL METHOD: NORMAL
EOSINOPHIL # BLD AUTO: 0.4 K/UL (ref 0–0.5)
EOSINOPHIL NFR BLD: 4.3 % (ref 0–8)
ERYTHROCYTE [DISTWIDTH] IN BLOOD BY AUTOMATED COUNT: 13.2 % (ref 11.5–14.5)
EST. GFR  (AFRICAN AMERICAN): >60 ML/MIN/1.73 M^2
EST. GFR  (NON AFRICAN AMERICAN): >60 ML/MIN/1.73 M^2
GLUCOSE SERPL-MCNC: 96 MG/DL (ref 70–110)
GLUCOSE UR QL STRIP: NEGATIVE
HCT VFR BLD AUTO: 37.5 % (ref 37–48.5)
HGB BLD-MCNC: 12.2 G/DL (ref 12–16)
HGB UR QL STRIP: NEGATIVE
IMM GRANULOCYTES # BLD AUTO: 0.03 K/UL (ref 0–0.04)
IMM GRANULOCYTES NFR BLD AUTO: 0.3 % (ref 0–0.5)
KETONES UR QL STRIP: ABNORMAL
LEUKOCYTE ESTERASE UR QL STRIP: ABNORMAL
LYMPHOCYTES # BLD AUTO: 2.9 K/UL (ref 1–4.8)
LYMPHOCYTES NFR BLD: 28.5 % (ref 18–48)
MCH RBC QN AUTO: 29 PG (ref 27–31)
MCHC RBC AUTO-ENTMCNC: 32.5 G/DL (ref 32–36)
MCV RBC AUTO: 89 FL (ref 82–98)
MICROSCOPIC COMMENT: ABNORMAL
MONOCYTES # BLD AUTO: 0.7 K/UL (ref 0.3–1)
MONOCYTES NFR BLD: 7.1 % (ref 4–15)
NEUTROPHILS # BLD AUTO: 6 K/UL (ref 1.8–7.7)
NEUTROPHILS NFR BLD: 59.3 % (ref 38–73)
NITRITE UR QL STRIP: NEGATIVE
NRBC BLD-RTO: 0 /100 WBC
PH UR STRIP: 5 [PH] (ref 5–8)
PLATELET # BLD AUTO: 286 K/UL (ref 150–350)
PMV BLD AUTO: 10.5 FL (ref 9.2–12.9)
POCT GLUCOSE: 97 MG/DL (ref 70–110)
POTASSIUM SERPL-SCNC: 3.8 MMOL/L (ref 3.5–5.1)
PROT SERPL-MCNC: 6.9 G/DL (ref 6–8.4)
PROT UR QL STRIP: NEGATIVE
RBC # BLD AUTO: 4.2 M/UL (ref 4–5.4)
RBC #/AREA URNS AUTO: 1 /HPF (ref 0–4)
SODIUM SERPL-SCNC: 142 MMOL/L (ref 136–145)
SP GR UR STRIP: 1.02 (ref 1–1.03)
SQUAMOUS #/AREA URNS AUTO: 22 /HPF
URN SPEC COLLECT METH UR: ABNORMAL
WBC # BLD AUTO: 10.07 K/UL (ref 3.9–12.7)
WBC #/AREA URNS AUTO: 11 /HPF (ref 0–5)

## 2019-05-04 PROCEDURE — 99284 EMERGENCY DEPT VISIT MOD MDM: CPT | Mod: ,,, | Performed by: PHYSICIAN ASSISTANT

## 2019-05-04 PROCEDURE — 81001 URINALYSIS AUTO W/SCOPE: CPT

## 2019-05-04 PROCEDURE — 87086 URINE CULTURE/COLONY COUNT: CPT

## 2019-05-04 PROCEDURE — 85025 COMPLETE CBC W/AUTO DIFF WBC: CPT

## 2019-05-04 PROCEDURE — 99285 EMERGENCY DEPT VISIT HI MDM: CPT | Mod: 25

## 2019-05-04 PROCEDURE — 87088 URINE BACTERIA CULTURE: CPT

## 2019-05-04 PROCEDURE — 80053 COMPREHEN METABOLIC PANEL: CPT

## 2019-05-04 PROCEDURE — 82962 GLUCOSE BLOOD TEST: CPT

## 2019-05-04 PROCEDURE — 81025 URINE PREGNANCY TEST: CPT | Performed by: PHYSICIAN ASSISTANT

## 2019-05-04 PROCEDURE — 93010 ELECTROCARDIOGRAM REPORT: CPT | Mod: ,,, | Performed by: INTERNAL MEDICINE

## 2019-05-04 PROCEDURE — 25500020 PHARM REV CODE 255: Performed by: EMERGENCY MEDICINE

## 2019-05-04 PROCEDURE — 93010 EKG 12-LEAD: ICD-10-PCS | Mod: ,,, | Performed by: INTERNAL MEDICINE

## 2019-05-04 PROCEDURE — 93005 ELECTROCARDIOGRAM TRACING: CPT

## 2019-05-04 PROCEDURE — 99284 PR EMERGENCY DEPT VISIT,LEVEL IV: ICD-10-PCS | Mod: ,,, | Performed by: PHYSICIAN ASSISTANT

## 2019-05-04 RX ORDER — NAPROXEN 500 MG/1
500 TABLET ORAL 2 TIMES DAILY WITH MEALS
Qty: 20 TABLET | Refills: 0 | Status: ON HOLD | OUTPATIENT
Start: 2019-05-04 | End: 2021-09-11

## 2019-05-04 RX ADMIN — IOHEXOL 75 ML: 350 INJECTION, SOLUTION INTRAVENOUS at 09:05

## 2019-05-04 NOTE — ED NOTES
LOC: The patient is awake and alert; oriented x 3 and speaking appropriately.  APPEARANCE: Patient resting comfortably, patient is clean and well groomed  SKIN: warm and dry, normal skin turgor & moist mucus membranes, skin intact, no breakdown noted.  MUSCULOSKELETAL: Patient moving all extremities well, no obvious swelling or deformities noted, c/o heavy feeling on left side of body w/ decreased sensation in left arm.   RESPIRATORY: Airway is open and patent,  respirations are spontaneous, normal effort and rate  CARDIAC: Patient has a normal rate, no peripheral edema noted, capillary refill < 3 seconds; No complaints of chest pain   ABDOMEN: Soft and non tender to palpation, no distention noted. Bowel sounds present x 4

## 2019-05-04 NOTE — ED TRIAGE NOTES
Arrives per EMS from home. Pt was cooking and fainted.  found pt on floor- pt states she was on the floor for no longer than 10 minutes. Denies striking head. C/o a dull pain in left lower abdomen . Having n/v after fall. Denies incontinence  W/ LOC. States the left side of her body feels heavy - onset after fall/ LOC.  Feels exhausted when she bends her chin to her chest. AAOx4,  No facial droop. No drifts. States left arm has decreased sensation.  states pt stood after fall and walked to restroom and came back pale and  having n./v.

## 2019-05-05 NOTE — PROVIDER PROGRESS NOTES - EMERGENCY DEPT.
"Encounter Date: 5/4/2019    ED Physician Progress Notes        Physician Note:   9:57 PM.    ED Physician/MARIE Hand-off Note:    ED Course: I assumed care of patient from off-going ED team, Al Fofana PA-C. Briefly, Patient is a 28 year old female that presents to the ED with LLQ pain and syncope..    At the time of signout plan was pending CTAP with contrast impressions.    Medications given in the ED:    Medications  iohexol (OMNIPAQUE 350) injection 75 mL (75 mLs Intravenous Given 5/4/19 2102)    Disposition: CTAP with contrast shows involuting or hemorrhagic follicle within the left ovary. Uriinary bladder is nondistended, likely accounting for mild wall thickening however correlation with urinalysis is recommended to exclude changes of cystitis. UA today with 22 squams vs 11 WBC. No urinary complaints. Primary team suspects contamination. Urine culture pending. I will not treat at this time. Patient reassess. She reports feeling improved. Just mild "dull pain". Patient was updated on her results. She states that this is her typical pain and she "lives with it". Pelvic pain deferred given low threshold for infection and ovarian torsion. I will discharge patient with ovarian cyst pain. I will Rx naproxen for pain relief. She is to follow up with OBGYN. She was given strict ED return precautions for new or worsening symptoms, and she voices her understanding. All questions answered. Patient comfortable with plan and stable for discharge.    I have discussed this case with my supervising MD.     Jazz Craft PA-C  Emergent Department  [facility]  Spectralink #[phone] or #[phone]    Impression: left ovarian cyst, syncope      Jazz Craft PA-C  Emergent Department  [facility]  Spectralink #[phone] or #[phone]           "

## 2019-05-05 NOTE — DISCHARGE INSTRUCTIONS
Take nonosteroidal anti-inflammatories (naproxen) 2 times a day which is ever 12 hours for pain relief.     No future appointments.    Our goal in the emergency department is to always give you outstanding care and exceptional service. You may receive a survey by mail or e-mail in the next week regarding your experience in our ED. We would greatly appreciate your completing and returning the survey. Your feedback provides us with a way to recognize our staff who give very good care and it helps us learn how to improve when your experience was below our aspiration of excellence.

## 2019-05-05 NOTE — ED PROVIDER NOTES
Encounter Date: 5/4/2019       History     Chief Complaint   Patient presents with    Loss of Consciousness     Passed out while cooking. LLQ pain radiated to back.      The patient presents to the ER for an emergent evaluation due to syncope and collapse. The episode occurred just prior to arrival. She states that she was standing in the kitchen cooking, when she suddenly felt hot and lightheaded. She states that she stopped and leaned over her counter to see if it would pass, and the next thing she remembers is waking up lying on her kitchen floor. She denies any injuries or pain from the fall. She does also report having LLQ abdominal pain off and on for several months. She states that the pain has been worse the past week. She states that she attributed it to ovarian cysts because she has had similar pain in the past due to cysts and she is expecting to start her period this week. She denies any additional symptoms or concerns. She denies any mitigating or exacerbating factors.          Review of patient's allergies indicates:  No Known Allergies  Past Medical History:   Diagnosis Date    Abnormal Pap smear of cervix 2016    ASCUS + HPV     Fever blister     Ovarian cyst      Past Surgical History:   Procedure Laterality Date    EYE SURGERY       Family History   Problem Relation Age of Onset    Ovarian cancer Maternal Grandmother     Colon cancer Neg Hx     Breast cancer Neg Hx     Melanoma Neg Hx     Cancer Neg Hx      Social History     Tobacco Use    Smoking status: Current Every Day Smoker     Packs/day: 0.50     Types: Cigarettes    Smokeless tobacco: Never Used    Tobacco comment: stopped with +UPT    Substance Use Topics    Alcohol use: Yes     Comment: occassionally    Drug use: Yes     Types: Marijuana     Comment: pre pregnancy     Review of Systems   Constitutional: Negative for activity change, appetite change, chills, diaphoresis and fever.   HENT: Negative for congestion and sore  throat.    Eyes: Negative for pain and visual disturbance.   Respiratory: Negative for cough, chest tightness and shortness of breath.    Cardiovascular: Negative for chest pain.   Gastrointestinal: Positive for abdominal pain. Negative for abdominal distention, constipation, diarrhea, nausea and vomiting.   Genitourinary: Negative for decreased urine volume, difficulty urinating, dysuria, flank pain, frequency, menstrual problem, urgency, vaginal bleeding and vaginal discharge.   Musculoskeletal: Negative for arthralgias, back pain, gait problem, joint swelling, myalgias, neck pain and neck stiffness.   Skin: Negative for rash.   Neurological: Positive for syncope and light-headedness. Negative for dizziness, tremors, seizures, facial asymmetry, speech difficulty, weakness, numbness and headaches.   Psychiatric/Behavioral: Negative for confusion.       Physical Exam     Initial Vitals [05/04/19 1809]   BP Pulse Resp Temp SpO2   120/68 90 16 98 °F (36.7 °C) 99 %      MAP       --         Physical Exam    Nursing note and vitals reviewed.  Constitutional: She appears well-developed and well-nourished.   HENT:   Head: Normocephalic.   Eyes: Conjunctivae are normal.   Cardiovascular: Normal rate and intact distal pulses.   Pulmonary/Chest: No respiratory distress.   Abdominal: Soft. She exhibits no distension. There is tenderness. There is no rebound and no guarding.   LLQ tenderness - mild & diffuse    Musculoskeletal: Normal range of motion.   Neurological: She is alert and oriented to person, place, and time. She has normal strength. No cranial nerve deficit or sensory deficit. GCS score is 15. GCS eye subscore is 4. GCS verbal subscore is 5. GCS motor subscore is 6.   Skin: Skin is warm and dry. No rash noted.   Psychiatric: She has a normal mood and affect. Her behavior is normal.         ED Course   Procedures  Labs Reviewed   URINALYSIS, REFLEX TO URINE CULTURE - Abnormal; Notable for the following components:        Result Value    Appearance, UA Hazy (*)     Ketones, UA Trace (*)     Leukocytes, UA Trace (*)     All other components within normal limits    Narrative:     Preferred Collection Type->Urine, Clean Catch  orange top cup   COMPREHENSIVE METABOLIC PANEL - Abnormal; Notable for the following components:    CO2 21 (*)     Alkaline Phosphatase 52 (*)     All other components within normal limits   URINALYSIS MICROSCOPIC - Abnormal; Notable for the following components:    WBC, UA 11 (*)     Bacteria Moderate (*)     All other components within normal limits    Narrative:     Preferred Collection Type->Urine, Clean Catch  orange top cup   CULTURE, URINE   CBC W/ AUTO DIFFERENTIAL   POCT URINE PREGNANCY   POCT GLUCOSE     Results for orders placed or performed during the hospital encounter of 05/04/19   Urinalysis, Reflex to Urine Culture Urine, Clean Catch   Result Value Ref Range    Specimen UA Urine, Clean Catch     Color, UA Yellow Yellow, Straw, [place]    Appearance, UA Hazy (A) Clear    pH, UA 5.0 5.0 - 8.0    Specific Gravity, UA 1.025 1.005 - 1.030    Protein, UA Negative Negative    Glucose, UA Negative Negative    Ketones, UA Trace (A) Negative    Bilirubin (UA) Negative Negative    Occult Blood UA Negative Negative    Nitrite, UA Negative Negative    Leukocytes, UA Trace (A) Negative   CBC auto differential   Result Value Ref Range    WBC 10.07 3.90 - 12.70 K/uL    RBC 4.20 4.00 - 5.40 M/uL    Hemoglobin 12.2 12.0 - 16.0 g/dL    Hematocrit 37.5 37.0 - 48.5 %    Mean Corpuscular Volume 89 82 - 98 fL    Mean Corpuscular Hemoglobin 29.0 27.0 - 31.0 pg    Mean Corpuscular Hemoglobin Conc 32.5 32.0 - 36.0 g/dL    RDW 13.2 11.5 - 14.5 %    Platelets 286 150 - 350 K/uL    MPV 10.5 9.2 - 12.9 fL    Immature Granulocytes 0.3 0.0 - 0.5 %    Gran # (ANC) 6.0 1.8 - 7.7 K/uL    Immature Grans (Abs) 0.03 0.00 - 0.04 K/uL    Lymph # 2.9 1.0 - 4.8 K/uL    Mono # 0.7 0.3 - 1.0 K/uL    Eos # 0.4 0.0 - 0.5 K/uL    Baso #  0.05 0.00 - 0.20 K/uL    nRBC 0 0 /100 WBC    Gran% 59.3 38.0 - 73.0 %    Lymph% 28.5 18.0 - 48.0 %    Mono% 7.1 4.0 - 15.0 %    Eosinophil% 4.3 0.0 - 8.0 %    Basophil% 0.5 0.0 - 1.9 %    Differential Method Automated    Comprehensive metabolic panel   Result Value Ref Range    Sodium 142 136 - 145 mmol/L    Potassium 3.8 3.5 - 5.1 mmol/L    Chloride 109 95 - 110 mmol/L    CO2 21 (L) 23 - 29 mmol/L    Glucose 96 70 - 110 mg/dL    BUN, Bld 18 6 - 20 mg/dL    Creatinine 0.8 0.5 - 1.4 mg/dL    Calcium 9.9 8.7 - 10.5 mg/dL    Total Protein 6.9 6.0 - 8.4 g/dL    Albumin 4.2 3.5 - 5.2 g/dL    Total Bilirubin 0.3 0.1 - 1.0 mg/dL    Alkaline Phosphatase 52 (L) 55 - 135 U/L    AST 11 10 - 40 U/L    ALT 10 10 - 44 U/L    Anion Gap 12 8 - 16 mmol/L    eGFR if African American >60.0 >60 mL/min/1.73 m^2    eGFR if non African American >60.0 >60 mL/min/1.73 m^2   Urinalysis Microscopic   Result Value Ref Range    RBC, UA 1 0 - 4 /hpf    WBC, UA 11 (H) 0 - 5 /hpf    Bacteria Moderate (A) None-Occ /hpf    Squam Epithel, UA 22 /hpf    Microscopic Comment SEE COMMENT    POCT urine pregnancy   Result Value Ref Range    POC Preg Test, Ur Negative Negative     Acceptable Yes    POCT glucose   Result Value Ref Range    POCT Glucose 97 70 - 110 mg/dL     Vitals:    05/04/19 1820 05/04/19 1845 05/04/19 1847 05/04/19 1849   BP: 115/86 110/60 110/87 105/64   BP Location: Right arm Right arm Right arm Right arm   Patient Position: Sitting Lying Sitting Standing   Pulse: 82 78 90 90   Resp: 16      Temp: 98.9 °F (37.2 °C)      TempSrc: Oral      SpO2: 100%      Weight:       Height:                Imaging Results          CT Abdomen Pelvis With Contrast (Final result)  Result time 05/04/19 21:36:46    Final result by Harish Teixeira MD (05/04/19 21:36:46)                 Impression:      1. Moderate stool in the right colon, could reflect developing constipation.  2. Involuting or hemorrhagic follicle within the left ovary,  correlation with any left lower quadrant pain, further evaluation with ultrasound if warranted.  3. The urinary bladder is nondistended, likely accounting for mild wall thickening however correlation with urinalysis is recommended to exclude changes of cystitis.  4. Additional findings above.      Electronically signed by: Harish Teixeira MD  Date:    05/04/2019  Time:    21:36             Narrative:    EXAMINATION:  CT ABDOMEN PELVIS WITH CONTRAST    CLINICAL HISTORY:  abdominal pain;    TECHNIQUE:  Low dose axial images, sagittal and coronal reformations were obtained from the lung bases to the pubic symphysis following the IV administration of 75 mL of Omnipaque 350 .  Oral contrast was not given.    COMPARISON:  None.    FINDINGS:  Images of the lower thorax are remarkable for mild dependent atelectasis.    The liver, spleen, pancreas, gallbladder and adrenal glands are grossly unremarkable.  There is no biliary dilation or ascites.  The portal vein, splenic vein, SMV, celiac axis and SMA all are patent.  There is no biliary dilation or ascites.  No significant abdominal lymphadenopathy.    The kidneys enhance symmetrically without hydronephrosis or nephrolithiasis.  The bilateral ureters are unremarkable without calculi seen.  The urinary bladder is decompressed.  There is an IUD within the uterus.  The right adnexa is unremarkable.  There is a hemorrhagic or involuting follicle within the left ovary.  There is a small amount of free fluid in the pelvis.    There is moderate stool throughout the colon without significant colonic wall thickening or inflammation.  The appendix and terminal ileum are unremarkable.  The small bowel is grossly unremarkable.  No focal organized pelvic fluid collection.    No focal osseous destructive process.  No significant inguinal lymphadenopathy.                               CT Head Without Contrast (Final result)  Result time 05/04/19 21:35:08    Final result by Kamari MORRIS  MD Brennen (05/04/19 21:35:08)                 Impression:      No CT evidence of acute intracranial abnormality.    Paranasal sinus disease.      Electronically signed by: Kamari Hutton MD  Date:    05/04/2019  Time:    21:35             Narrative:    EXAMINATION:  CT HEAD WITHOUT CONTRAST    CLINICAL HISTORY:  syncope and collapse;    TECHNIQUE:  Low dose axial images were obtained through the head.  Coronal and sagittal reformations were also performed. Contrast was not administered.    COMPARISON:  None.    FINDINGS:  No evidence of acute territorial infarct, hemorrhage, mass effect, or midline shift.    Ventricles are normal in size and configuration.    No displaced calvarial fracture.    Patchy areas of mucosal thickening and fluid in the frontal ethmoid and sphenoid sinuses.  Maxillary sinuses are below the field of view.  Mastoid air cells are essentially clear.                                 Medical Decision Making:   History:   Old Medical Records: I decided to obtain old medical records.  Initial Assessment:   Pt with LLQ pain for months, worse this week, presents to the ER after an acute episode of syncope and collapse occurred this evening.   Differential Diagnosis:   Diverticulitis, Colitis, Ovarian cyst, TOA, Ectopic pregnancy, PID, Kidney stone, UTI, Aneurysm, CVA, Brain mass, seizure, orthostatic hypotension, vaso-vagal episode, Dysrhythmia, bradycardia, infection, electrolyte abnormality, anemia, etc     Clinical Tests:   Lab Tests: Ordered and Reviewed  Radiological Study: Ordered and Reviewed  Medical Tests: Ordered and Reviewed  ED Management:  I discussed the case in detail with the ER attending physician   Report given to Lilibeth Craft for sinal disposition due to pending CT report and end of shift   Other:   I have discussed this case with another health care provider.              Attending Attestation:     Physician Attestation Statement for NP/PA:   I have conducted a face to face encounter  with this patient in addition to the NP/PA, due to    Other NP/PA Attestation Additions:    History of Present Illness: 28 F with hx of ovarian cyst here with syncopal episodes and LLQ pain.     Medical Decision Making: Labs reviewed, unremarkable.  CT head:  Unremarkable  CT abdomen pelvis:  Left ovarian cyst possibly hemorrhagic, otherwise no acute process  Patient reports resolution of symptoms while emergency room department.  Recommend follow up with [facility] for further evaluation       Physician Attestation for Scribe:      Comments: I, Dr. Lennie Baker, personally performed the services described in this documentation. All medical record entries made by the scribe were at my direction and in my presence.  I have reviewed the chart and agree that the record reflects my personal performance and is accurate and complete. Lennie Baker MD.                 Clinical Impression:       ICD-10-CM ICD-9-CM   1. Ovarian cyst, left N83.202 620.2   2. Syncope R55 780.2   3. Syncope and collapse R55 780.2   4. Intermittent left lower quadrant abdominal pain R10.32 789.04         Disposition:   Disposition: Discharged  Condition: Stable                        Al Fofana PA-C  05/04/19 2131       Lennie Baker MD  05/04/19 3150

## 2019-05-06 LAB — BACTERIA UR CULT: NORMAL

## 2019-08-23 ENCOUNTER — HOSPITAL ENCOUNTER (EMERGENCY)
Facility: HOSPITAL | Age: 29
Discharge: HOME | End: 2019-08-24
Attending: EMERGENCY MEDICINE
Payer: MEDICAID

## 2019-08-23 VITALS
TEMPERATURE: 98 F | HEART RATE: 82 BPM | BODY MASS INDEX: 20.49 KG/M2 | OXYGEN SATURATION: 100 % | RESPIRATION RATE: 14 BRPM | SYSTOLIC BLOOD PRESSURE: 123 MMHG | WEIGHT: 120 LBS | HEIGHT: 64 IN | DIASTOLIC BLOOD PRESSURE: 79 MMHG

## 2019-08-23 DIAGNOSIS — R42 LIGHTHEADEDNESS: ICD-10-CM

## 2019-08-23 DIAGNOSIS — R53.83 FATIGUE, UNSPECIFIED TYPE: Primary | ICD-10-CM

## 2019-08-23 LAB
ALBUMIN SERPL BCP-MCNC: 4.6 G/DL (ref 3.5–5.2)
ALP SERPL-CCNC: 50 U/L (ref 55–135)
ALT SERPL W/O P-5'-P-CCNC: 14 U/L (ref 10–44)
ANION GAP SERPL CALC-SCNC: 11 MMOL/L (ref 8–16)
AST SERPL-CCNC: 12 U/L (ref 10–40)
B-HCG UR QL: NEGATIVE
BASOPHILS # BLD AUTO: 0.06 K/UL (ref 0–0.2)
BASOPHILS NFR BLD: 0.6 % (ref 0–1.9)
BILIRUB SERPL-MCNC: 0.4 MG/DL (ref 0.1–1)
BILIRUB UR QL STRIP: NEGATIVE
BUN SERPL-MCNC: 18 MG/DL (ref 6–20)
CALCIUM SERPL-MCNC: 9.9 MG/DL (ref 8.7–10.5)
CHLORIDE SERPL-SCNC: 108 MMOL/L (ref 95–110)
CLARITY UR REFRACT.AUTO: CLEAR
CO2 SERPL-SCNC: 22 MMOL/L (ref 23–29)
COLOR UR AUTO: YELLOW
CREAT SERPL-MCNC: 0.7 MG/DL (ref 0.5–1.4)
CTP QC/QA: YES
DIFFERENTIAL METHOD: NORMAL
EOSINOPHIL # BLD AUTO: 0.4 K/UL (ref 0–0.5)
EOSINOPHIL NFR BLD: 3.5 % (ref 0–8)
ERYTHROCYTE [DISTWIDTH] IN BLOOD BY AUTOMATED COUNT: 12.4 % (ref 11.5–14.5)
EST. GFR  (AFRICAN AMERICAN): >60 ML/MIN/1.73 M^2
EST. GFR  (NON AFRICAN AMERICAN): >60 ML/MIN/1.73 M^2
GLUCOSE SERPL-MCNC: 94 MG/DL (ref 70–110)
GLUCOSE UR QL STRIP: NEGATIVE
HCT VFR BLD AUTO: 39.2 % (ref 37–48.5)
HGB BLD-MCNC: 12.6 G/DL (ref 12–16)
HGB UR QL STRIP: NEGATIVE
IMM GRANULOCYTES # BLD AUTO: 0.02 K/UL (ref 0–0.04)
IMM GRANULOCYTES NFR BLD AUTO: 0.2 % (ref 0–0.5)
KETONES UR QL STRIP: ABNORMAL
LEUKOCYTE ESTERASE UR QL STRIP: NEGATIVE
LYMPHOCYTES # BLD AUTO: 3.7 K/UL (ref 1–4.8)
LYMPHOCYTES NFR BLD: 37.5 % (ref 18–48)
MCH RBC QN AUTO: 29.2 PG (ref 27–31)
MCHC RBC AUTO-ENTMCNC: 32.1 G/DL (ref 32–36)
MCV RBC AUTO: 91 FL (ref 82–98)
MONOCYTES # BLD AUTO: 0.8 K/UL (ref 0.3–1)
MONOCYTES NFR BLD: 7.7 % (ref 4–15)
NEUTROPHILS # BLD AUTO: 5 K/UL (ref 1.8–7.7)
NEUTROPHILS NFR BLD: 50.5 % (ref 38–73)
NITRITE UR QL STRIP: NEGATIVE
NRBC BLD-RTO: 0 /100 WBC
PH UR STRIP: 5 [PH] (ref 5–8)
PLATELET # BLD AUTO: 303 K/UL (ref 150–350)
PMV BLD AUTO: 10.4 FL (ref 9.2–12.9)
POTASSIUM SERPL-SCNC: 3.9 MMOL/L (ref 3.5–5.1)
PROT SERPL-MCNC: 7.2 G/DL (ref 6–8.4)
PROT UR QL STRIP: NEGATIVE
RBC # BLD AUTO: 4.32 M/UL (ref 4–5.4)
SODIUM SERPL-SCNC: 141 MMOL/L (ref 136–145)
SP GR UR STRIP: 1.02 (ref 1–1.03)
URN SPEC COLLECT METH UR: ABNORMAL
WBC # BLD AUTO: 9.9 K/UL (ref 3.9–12.7)

## 2019-08-23 PROCEDURE — 93005 ELECTROCARDIOGRAM TRACING: CPT

## 2019-08-23 PROCEDURE — 93010 EKG 12-LEAD: ICD-10-PCS | Mod: ,,, | Performed by: INTERNAL MEDICINE

## 2019-08-23 PROCEDURE — 99285 EMERGENCY DEPT VISIT HI MDM: CPT | Mod: ,,, | Performed by: EMERGENCY MEDICINE

## 2019-08-23 PROCEDURE — 81003 URINALYSIS AUTO W/O SCOPE: CPT

## 2019-08-23 PROCEDURE — 99285 EMERGENCY DEPT VISIT HI MDM: CPT | Mod: 25

## 2019-08-23 PROCEDURE — 80053 COMPREHEN METABOLIC PANEL: CPT

## 2019-08-23 PROCEDURE — 81025 URINE PREGNANCY TEST: CPT | Performed by: PHYSICIAN ASSISTANT

## 2019-08-23 PROCEDURE — 85025 COMPLETE CBC W/AUTO DIFF WBC: CPT

## 2019-08-23 PROCEDURE — 93010 ELECTROCARDIOGRAM REPORT: CPT | Mod: ,,, | Performed by: INTERNAL MEDICINE

## 2019-08-23 PROCEDURE — 99285 PR EMERGENCY DEPT VISIT,LEVEL V: ICD-10-PCS | Mod: ,,, | Performed by: EMERGENCY MEDICINE

## 2019-08-24 NOTE — PROVIDER PROGRESS NOTES - EMERGENCY DEPT.
Encounter Date: 8/23/2019    ED Physician Progress Notes         EKG - STEMI Decision  Initial Reading: No STEMI present.    I, Romy Pollard, am scribing for, and in the presence of, Dr. Bautista. I performed the above scribed service and the documentation accurately describes the services I performed. I attest to the accuracy of the note.

## 2019-08-24 NOTE — ED PROVIDER NOTES
"Encounter Date: 8/23/2019       History     Chief Complaint   Patient presents with    Chest Pain     x7 days, reports seen and told she has anxiety but does not have anxiety, also reports constipation with thin brown stool     28-year-old white female with no medical history presents to the ED with multiple medical complaints. For the past 3 months she reports she "does not feel well".  She reports fatigue, lightheadedness, upper abdominal "cramping", nausea, urinary frequency, "burning" chest discomfort, intermittent rashes.  She quit smoking 2 weeks ago.  LMP 08/10/2019.  She denies any URI symptoms or shortness of breath. She denies fever, dysuria, diarrhea.    The history is provided by the patient.     Review of patient's allergies indicates:  No Known Allergies  Past Medical History:   Diagnosis Date    Abnormal Pap smear of cervix 2016    ASCUS + HPV     Fever blister     Ovarian cyst      Past Surgical History:   Procedure Laterality Date    EYE SURGERY       Family History   Problem Relation Age of Onset    Ovarian cancer Maternal Grandmother     Colon cancer Neg Hx     Breast cancer Neg Hx     Melanoma Neg Hx     Cancer Neg Hx      Social History     Tobacco Use    Smoking status: Current Every Day Smoker     Packs/day: 0.50     Types: Cigarettes    Smokeless tobacco: Never Used    Tobacco comment: stopped with +UPT    Substance Use Topics    Alcohol use: Yes     Comment: occassionally    Drug use: Yes     Types: Marijuana     Comment: pre pregnancy     Review of Systems   Constitutional: Positive for chills and fatigue. Negative for fever.   HENT: Negative for congestion, rhinorrhea and sore throat.    Eyes: Negative for photophobia and visual disturbance.   Respiratory: Negative for cough and shortness of breath.    Cardiovascular: Positive for chest pain.   Gastrointestinal: Positive for abdominal pain, constipation and nausea. Negative for diarrhea and vomiting.   Genitourinary: " Positive for frequency. Negative for dysuria, hematuria, vaginal bleeding and vaginal discharge.   Musculoskeletal: Positive for myalgias. Negative for neck pain and neck stiffness.   Skin: Negative for rash.   Neurological: Positive for light-headedness. Negative for syncope, numbness and headaches.   Psychiatric/Behavioral: Negative for confusion.       Physical Exam     Initial Vitals [08/23/19 1828]   BP Pulse Resp Temp SpO2   (!) 142/75 103 16 98.2 °F (36.8 °C) 100 %      MAP       --         Physical Exam    Nursing note and vitals reviewed.  Constitutional: She appears well-developed and well-nourished. She is not diaphoretic. No distress.   HENT:   Head: Normocephalic and atraumatic.   Neck: Normal range of motion. Neck supple.   Cardiovascular: Normal rate, regular rhythm and normal heart sounds. Exam reveals no gallop and no friction rub.    No murmur heard.  Pulmonary/Chest: Breath sounds normal. She has no wheezes. She has no rhonchi. She has no rales. She exhibits tenderness (R upper chest wall tenderness).   Abdominal: Soft. Bowel sounds are normal. There is no tenderness. There is no rebound and no guarding.   Musculoskeletal: Normal range of motion.   Neurological: She is alert and oriented to person, place, and time.   Skin: Skin is warm and dry. Rash (R posterior leg) noted. No erythema.   Psychiatric: Her mood appears anxious.         ED Course   Procedures  Labs Reviewed   COMPREHENSIVE METABOLIC PANEL - Abnormal; Notable for the following components:       Result Value    CO2 22 (*)     Alkaline Phosphatase 50 (*)     All other components within normal limits   URINALYSIS, REFLEX TO URINE CULTURE - Abnormal; Notable for the following components:    Ketones, UA Trace (*)     All other components within normal limits    Narrative:     Preferred Collection Type->Urine, Clean Catch   CBC W/ AUTO DIFFERENTIAL   POCT URINE PREGNANCY          Imaging Results          X-Ray Chest PA And Lateral (Final  "result)  Result time 08/23/19 23:21:22    Final result by Kamari Hutton MD (08/23/19 23:21:22)                 Impression:      No acute cardiopulmonary finding.      Electronically signed by: Kamari Hutton MD  Date:    08/23/2019  Time:    23:21             Narrative:    EXAMINATION:  XR CHEST PA AND LATERAL    CLINICAL HISTORY:  Provided history is "fatigue;  ".    TECHNIQUE:  Frontal and lateral views of the chest were performed.    COMPARISON:  05/04/2019.    FINDINGS:  Hair artifact overlies the chest.  Cardiac silhouette is not enlarged. No focal consolidation.  No sizable pleural effusion.  No pneumothorax.                                 Medical Decision Making:   History:   Old Medical Records: I decided to obtain old medical records.  Clinical Tests:   Lab Tests: Ordered and Reviewed  Radiological Study: Ordered and Reviewed  Medical Tests: Reviewed and Ordered       APC / Resident Notes:   28-year-old white female with no medical history presents to the ED with multiple medical complaints. Patient appears anxious.  Regular rate and rhythm. Lungs are clear.  Abdomen is soft and nontender. Chest wall tenderness noted to the right upper chest wall.  Rash noted to the posterior right leg.  Patient became tearful during interview when talking about increased stress with her mother living with them recently.  Differential diagnosis includes but is not limited to anemia, dehydration, acute kidney injury, UTI, pregnancy.  Will obtain labs, chest x-ray.    EKG shows NSR.    UPT negative. UA with no infection. No leukocytosis. CMP unremarkable.    CXR independently reviewed with no infiltrate.    I do not feel the patient needs any further labs or imaging at this time.  Stable for discharge.    She was discharged without any new prescriptions.  She will follow up with PCP, she was provided with a community resource list..  All of the patient's questions were answered.  I reviewed the patient's chart, labs, " and imaging and discussed the case with my supervising physician.                    Clinical Impression:       ICD-10-CM ICD-9-CM   1. Fatigue, unspecified type R53.83 780.79   2. Lightheadedness R42 780.4         Disposition:   Disposition: Discharged  Condition: Stable                        Quiat Starr PA-C  08/24/19 0100

## 2019-12-12 NOTE — ANESTHESIA PROCEDURE NOTES
Epidural    Patient location during procedure: OB   Reason for block: primary anesthetic   Diagnosis: Active Labor   Start time: 12/9/2017 11:50 AM  Timeout: 12/9/2017 11:45 AM  End time: 12/9/2017 12:00 PM  Surgery related to: Vaginal Delivery  Staffing  Anesthesiologist: LUCÍA KING  Resident/CRNA: REY BONE  Performed: resident/CRNA   Preanesthetic Checklist  Completed: patient identified, site marked, surgical consent, pre-op evaluation, timeout performed, IV checked, risks and benefits discussed, monitors and equipment checked, anesthesia consent given, hand hygiene performed and patient being monitored  Preparation  Patient position: sitting  Prep: ChloraPrep  Patient monitoring: Pulse Ox and Blood Pressure  Epidural  Skin Anesthetic: lidocaine 1%  Skin Wheal: 3 mL  Administration type: continuous  Approach: midline  Interspace: L4-5  Injection technique: JADON saline  Needle and Epidural Catheter  Needle type: Tuohy   Needle gauge: 17  Needle length: 3.5 inches  Needle insertion depth: 6 cm  Catheter type: springwound and multi-orifice  Catheter size: 19 G  Catheter at skin depth: 10 cm  Test dose: 3 mL of lidocaine 1.5% with Epi 1-to-200,000  Additional Documentation: incremental injection, negative aspiration for heme and CSF, no paresthesia on injection, no signs/symptoms of IV or SA injection, no significant pain on injection and no significant complaints from patient  Needle localization: anatomical landmarks  Medications:  Bolus administered: 10 mL of 0.125% bupivacaine  Opioid administered: 100 mcg of   fentanyl  Volume per aspiration: 5 mL  Time between aspirations: 5 minutes  Assessment   Dermatomal levels determined by ice  Ease of block: easy  Patient's tolerance of the procedure: comfortable throughout block and no complaints           Statement Selected

## 2021-02-01 LAB
C TRACH RRNA SPEC QL PROBE: NORMAL
HBV SURFACE AG SERPL QL IA: NEGATIVE
HIV 1+2 AB+HIV1 P24 AG SERPL QL IA: NORMAL
INDIRECT COOMBS: NORMAL
N GONORRHOEAE, AMPLIFIED DNA: NORMAL
RPR: NON REACTIVE
RUBELLA IMMUNE STATUS: NORMAL

## 2021-08-25 LAB — PRENATAL STREP B CULTURE: POSITIVE

## 2021-09-11 ENCOUNTER — HOSPITAL ENCOUNTER (OUTPATIENT)
Facility: HOSPITAL | Age: 31
Discharge: HOME | End: 2021-09-12
Attending: SPECIALIST | Admitting: SPECIALIST
Payer: MEDICAID

## 2021-09-11 DIAGNOSIS — N85.8 ALTERATION IN COMFORT ASSOCIATED WITH UTERINE CONTRACTIONS: ICD-10-CM

## 2021-09-11 LAB
BASOPHILS # BLD AUTO: 0.05 K/UL (ref 0–0.2)
BASOPHILS NFR BLD: 0.5 % (ref 0–1.9)
DIFFERENTIAL METHOD: ABNORMAL
EOSINOPHIL # BLD AUTO: 0.2 K/UL (ref 0–0.5)
EOSINOPHIL NFR BLD: 1.9 % (ref 0–8)
ERYTHROCYTE [DISTWIDTH] IN BLOOD BY AUTOMATED COUNT: 15.2 % (ref 11.5–14.5)
HCT VFR BLD AUTO: 31.7 % (ref 37–48.5)
HGB BLD-MCNC: 10.3 G/DL (ref 12–16)
IMM GRANULOCYTES # BLD AUTO: 0.15 K/UL (ref 0–0.04)
IMM GRANULOCYTES NFR BLD AUTO: 1.4 % (ref 0–0.5)
LYMPHOCYTES # BLD AUTO: 2.2 K/UL (ref 1–4.8)
LYMPHOCYTES NFR BLD: 20.4 % (ref 18–48)
MCH RBC QN AUTO: 27.5 PG (ref 27–31)
MCHC RBC AUTO-ENTMCNC: 32.5 G/DL (ref 32–36)
MCV RBC AUTO: 85 FL (ref 82–98)
MONOCYTES # BLD AUTO: 1.1 K/UL (ref 0.3–1)
MONOCYTES NFR BLD: 10.5 % (ref 4–15)
NEUTROPHILS # BLD AUTO: 7.1 K/UL (ref 1.8–7.7)
NEUTROPHILS NFR BLD: 65.3 % (ref 38–73)
NRBC BLD-RTO: 0 /100 WBC
PLATELET # BLD AUTO: 305 K/UL (ref 150–450)
PMV BLD AUTO: 10.2 FL (ref 9.2–12.9)
RBC # BLD AUTO: 3.74 M/UL (ref 4–5.4)
SARS-COV-2 RDRP RESP QL NAA+PROBE: NEGATIVE
WBC # BLD AUTO: 10.87 K/UL (ref 3.9–12.7)

## 2021-09-11 PROCEDURE — 25000003 PHARM REV CODE 250: Performed by: SPECIALIST

## 2021-09-11 PROCEDURE — 86592 SYPHILIS TEST NON-TREP QUAL: CPT | Performed by: SPECIALIST

## 2021-09-11 PROCEDURE — U0002 COVID-19 LAB TEST NON-CDC: HCPCS | Performed by: SPECIALIST

## 2021-09-11 PROCEDURE — 63600175 PHARM REV CODE 636 W HCPCS: Performed by: SPECIALIST

## 2021-09-11 PROCEDURE — 12000002 HC ACUTE/MED SURGE SEMI-PRIVATE ROOM

## 2021-09-11 PROCEDURE — 85025 COMPLETE CBC W/AUTO DIFF WBC: CPT | Performed by: SPECIALIST

## 2021-09-11 PROCEDURE — 86900 BLOOD TYPING SEROLOGIC ABO: CPT | Performed by: SPECIALIST

## 2021-09-11 RX ORDER — BUTORPHANOL TARTRATE 2 MG/ML
1 INJECTION INTRAMUSCULAR; INTRAVENOUS
Status: DISCONTINUED | OUTPATIENT
Start: 2021-09-11 | End: 2021-09-12 | Stop reason: HOSPADM

## 2021-09-11 RX ORDER — CALCIUM CARBONATE 200(500)MG
500 TABLET,CHEWABLE ORAL 3 TIMES DAILY PRN
Status: DISCONTINUED | OUTPATIENT
Start: 2021-09-11 | End: 2021-09-12 | Stop reason: HOSPADM

## 2021-09-11 RX ORDER — SODIUM CHLORIDE, SODIUM LACTATE, POTASSIUM CHLORIDE, CALCIUM CHLORIDE 600; 310; 30; 20 MG/100ML; MG/100ML; MG/100ML; MG/100ML
INJECTION, SOLUTION INTRAVENOUS CONTINUOUS
Status: DISCONTINUED | OUTPATIENT
Start: 2021-09-11 | End: 2021-09-12 | Stop reason: HOSPADM

## 2021-09-11 RX ORDER — ONDANSETRON 4 MG/1
8 TABLET, ORALLY DISINTEGRATING ORAL EVERY 8 HOURS PRN
Status: DISCONTINUED | OUTPATIENT
Start: 2021-09-11 | End: 2021-09-12 | Stop reason: HOSPADM

## 2021-09-11 RX ORDER — METHYLERGONOVINE MALEATE 0.2 MG/ML
200 INJECTION INTRAVENOUS
Status: DISCONTINUED | OUTPATIENT
Start: 2021-09-11 | End: 2021-09-12 | Stop reason: HOSPADM

## 2021-09-11 RX ORDER — PROCHLORPERAZINE EDISYLATE 5 MG/ML
5 INJECTION INTRAMUSCULAR; INTRAVENOUS EVERY 6 HOURS PRN
Status: DISCONTINUED | OUTPATIENT
Start: 2021-09-11 | End: 2021-09-12 | Stop reason: HOSPADM

## 2021-09-11 RX ORDER — CARBOPROST TROMETHAMINE 250 UG/ML
250 INJECTION, SOLUTION INTRAMUSCULAR
Status: DISCONTINUED | OUTPATIENT
Start: 2021-09-11 | End: 2021-09-12 | Stop reason: HOSPADM

## 2021-09-11 RX ORDER — MISOPROSTOL 200 UG/1
800 TABLET ORAL
Status: DISCONTINUED | OUTPATIENT
Start: 2021-09-11 | End: 2021-09-12 | Stop reason: HOSPADM

## 2021-09-11 RX ADMIN — VANCOMYCIN HYDROCHLORIDE 1500 MG: 1.5 INJECTION, POWDER, LYOPHILIZED, FOR SOLUTION INTRAVENOUS at 11:09

## 2021-09-11 RX ADMIN — SODIUM CHLORIDE, SODIUM LACTATE, POTASSIUM CHLORIDE, AND CALCIUM CHLORIDE: .6; .31; .03; .02 INJECTION, SOLUTION INTRAVENOUS at 11:09

## 2021-09-12 VITALS
DIASTOLIC BLOOD PRESSURE: 68 MMHG | TEMPERATURE: 98 F | HEIGHT: 64 IN | HEART RATE: 96 BPM | SYSTOLIC BLOOD PRESSURE: 116 MMHG | WEIGHT: 214 LBS | RESPIRATION RATE: 18 BRPM | BODY MASS INDEX: 36.54 KG/M2

## 2021-09-12 LAB
ABO + RH BLD: NORMAL
AMPHET+METHAMPHET UR QL: NEGATIVE
BARBITURATES UR QL SCN>200 NG/ML: NEGATIVE
BENZODIAZ UR QL SCN>200 NG/ML: NEGATIVE
BILIRUB UR QL STRIP: NEGATIVE
BLD GP AB SCN CELLS X3 SERPL QL: NORMAL
BUPRENORPHINE UR QL: NEGATIVE
BZE UR QL SCN: NEGATIVE
CANNABINOIDS UR QL SCN: ABNORMAL
CLARITY UR: ABNORMAL
COLOR UR: YELLOW
CREAT UR-MCNC: 58 MG/DL (ref 15–325)
GLUCOSE UR QL STRIP: NEGATIVE
HGB UR QL STRIP: NEGATIVE
KETONES UR QL STRIP: NEGATIVE
LEUKOCYTE ESTERASE UR QL STRIP: NEGATIVE
NITRITE UR QL STRIP: NEGATIVE
OPIATES UR QL SCN: NEGATIVE
PCP UR QL SCN>25 NG/ML: NEGATIVE
PH UR STRIP: 7 [PH] (ref 5–8)
PROT UR QL STRIP: NEGATIVE
SP GR UR STRIP: 1.01 (ref 1–1.03)
TOXICOLOGY INFORMATION: ABNORMAL
URN SPEC COLLECT METH UR: ABNORMAL
UROBILINOGEN UR STRIP-ACNC: NEGATIVE EU/DL

## 2021-09-12 PROCEDURE — 80307 DRUG TEST PRSMV CHEM ANLYZR: CPT | Performed by: SPECIALIST

## 2021-09-12 PROCEDURE — 25000003 PHARM REV CODE 250: Performed by: SPECIALIST

## 2021-09-12 PROCEDURE — 63600175 PHARM REV CODE 636 W HCPCS: Performed by: SPECIALIST

## 2021-09-12 PROCEDURE — 81003 URINALYSIS AUTO W/O SCOPE: CPT | Mod: 59 | Performed by: SPECIALIST

## 2021-09-12 RX ADMIN — PROMETHAZINE HYDROCHLORIDE 12.5 MG: 25 INJECTION INTRAMUSCULAR; INTRAVENOUS at 12:09

## 2021-09-12 RX ADMIN — BUTORPHANOL TARTRATE 1 MG: 2 INJECTION, SOLUTION INTRAMUSCULAR; INTRAVENOUS at 12:09

## 2021-09-13 LAB — RPR SER QL: NORMAL

## 2021-09-16 ENCOUNTER — ANESTHESIA EVENT (OUTPATIENT)
Dept: OBSTETRICS AND GYNECOLOGY | Facility: HOSPITAL | Age: 31
End: 2021-09-16
Payer: MEDICAID

## 2021-09-16 ENCOUNTER — ANESTHESIA (OUTPATIENT)
Dept: OBSTETRICS AND GYNECOLOGY | Facility: HOSPITAL | Age: 31
End: 2021-09-16
Payer: MEDICAID

## 2021-09-16 ENCOUNTER — HOSPITAL ENCOUNTER (INPATIENT)
Facility: HOSPITAL | Age: 31
LOS: 2 days | Discharge: HOME | End: 2021-09-18
Attending: SPECIALIST | Admitting: SPECIALIST
Payer: MEDICAID

## 2021-09-16 DIAGNOSIS — Z34.90 ENCOUNTER FOR ELECTIVE INDUCTION OF LABOR: ICD-10-CM

## 2021-09-16 LAB
ABO + RH BLD: NORMAL
AMPHET+METHAMPHET UR QL: NEGATIVE
ANION GAP SERPL CALC-SCNC: 12 MMOL/L (ref 8–16)
BARBITURATES UR QL SCN>200 NG/ML: NEGATIVE
BASOPHILS # BLD AUTO: 0.04 K/UL (ref 0–0.2)
BASOPHILS NFR BLD: 0.4 % (ref 0–1.9)
BENZODIAZ UR QL SCN>200 NG/ML: NEGATIVE
BILIRUB UR QL STRIP: NEGATIVE
BLD GP AB SCN CELLS X3 SERPL QL: NORMAL
BUN SERPL-MCNC: 7 MG/DL (ref 6–20)
BUPRENORPHINE UR QL: NEGATIVE
BZE UR QL SCN: NEGATIVE
CALCIUM SERPL-MCNC: 10 MG/DL (ref 8.7–10.5)
CANNABINOIDS UR QL SCN: NEGATIVE
CHLORIDE SERPL-SCNC: 105 MMOL/L (ref 95–110)
CLARITY UR: CLEAR
CO2 SERPL-SCNC: 20 MMOL/L (ref 23–29)
COLOR UR: YELLOW
CREAT SERPL-MCNC: 0.5 MG/DL (ref 0.5–1.4)
CREAT UR-MCNC: 34 MG/DL (ref 15–325)
DIFFERENTIAL METHOD: ABNORMAL
EOSINOPHIL # BLD AUTO: 0.2 K/UL (ref 0–0.5)
EOSINOPHIL NFR BLD: 2.2 % (ref 0–8)
ERYTHROCYTE [DISTWIDTH] IN BLOOD BY AUTOMATED COUNT: 14.9 % (ref 11.5–14.5)
EST. GFR  (AFRICAN AMERICAN): >60 ML/MIN/1.73 M^2
EST. GFR  (NON AFRICAN AMERICAN): >60 ML/MIN/1.73 M^2
GLUCOSE SERPL-MCNC: 87 MG/DL (ref 70–110)
GLUCOSE UR QL STRIP: NEGATIVE
HCT VFR BLD AUTO: 31.8 % (ref 37–48.5)
HGB BLD-MCNC: 10.3 G/DL (ref 12–16)
HGB UR QL STRIP: NEGATIVE
IMM GRANULOCYTES # BLD AUTO: 0.1 K/UL (ref 0–0.04)
IMM GRANULOCYTES NFR BLD AUTO: 1.1 % (ref 0–0.5)
KETONES UR QL STRIP: NEGATIVE
LEUKOCYTE ESTERASE UR QL STRIP: NEGATIVE
LYMPHOCYTES # BLD AUTO: 2 K/UL (ref 1–4.8)
LYMPHOCYTES NFR BLD: 22.1 % (ref 18–48)
MCH RBC QN AUTO: 27.9 PG (ref 27–31)
MCHC RBC AUTO-ENTMCNC: 32.4 G/DL (ref 32–36)
MCV RBC AUTO: 86 FL (ref 82–98)
MONOCYTES # BLD AUTO: 1 K/UL (ref 0.3–1)
MONOCYTES NFR BLD: 11.7 % (ref 4–15)
NEUTROPHILS # BLD AUTO: 5.6 K/UL (ref 1.8–7.7)
NEUTROPHILS NFR BLD: 62.5 % (ref 38–73)
NITRITE UR QL STRIP: NEGATIVE
NRBC BLD-RTO: 0 /100 WBC
OPIATES UR QL SCN: NEGATIVE
PCP UR QL SCN>25 NG/ML: NEGATIVE
PH UR STRIP: 8 [PH] (ref 5–8)
PLATELET # BLD AUTO: 303 K/UL (ref 150–450)
PMV BLD AUTO: 10.3 FL (ref 9.2–12.9)
POTASSIUM SERPL-SCNC: 4 MMOL/L (ref 3.5–5.1)
PROT UR QL STRIP: NEGATIVE
RBC # BLD AUTO: 3.69 M/UL (ref 4–5.4)
RPR SER QL: NORMAL
SARS-COV-2 RDRP RESP QL NAA+PROBE: NEGATIVE
SODIUM SERPL-SCNC: 137 MMOL/L (ref 136–145)
SP GR UR STRIP: 1 (ref 1–1.03)
TOXICOLOGY INFORMATION: NORMAL
URN SPEC COLLECT METH UR: NORMAL
UROBILINOGEN UR STRIP-ACNC: NEGATIVE EU/DL
WBC # BLD AUTO: 8.92 K/UL (ref 3.9–12.7)

## 2021-09-16 PROCEDURE — 85025 COMPLETE CBC W/AUTO DIFF WBC: CPT | Performed by: SPECIALIST

## 2021-09-16 PROCEDURE — 63600175 PHARM REV CODE 636 W HCPCS: Performed by: ANESTHESIOLOGY

## 2021-09-16 PROCEDURE — 86592 SYPHILIS TEST NON-TREP QUAL: CPT | Performed by: SPECIALIST

## 2021-09-16 PROCEDURE — 25000003 PHARM REV CODE 250: Performed by: ANESTHESIOLOGY

## 2021-09-16 PROCEDURE — 25000003 PHARM REV CODE 250: Performed by: SPECIALIST

## 2021-09-16 PROCEDURE — 81003 URINALYSIS AUTO W/O SCOPE: CPT | Mod: 59 | Performed by: SPECIALIST

## 2021-09-16 PROCEDURE — 86900 BLOOD TYPING SEROLOGIC ABO: CPT | Performed by: SPECIALIST

## 2021-09-16 PROCEDURE — 27200710 HC EPIDURAL INFUSION PUMP SET: Performed by: ANESTHESIOLOGY

## 2021-09-16 PROCEDURE — 80307 DRUG TEST PRSMV CHEM ANLYZR: CPT | Performed by: SPECIALIST

## 2021-09-16 PROCEDURE — 62326 NJX INTERLAMINAR LMBR/SAC: CPT | Performed by: ANESTHESIOLOGY

## 2021-09-16 PROCEDURE — 63600175 PHARM REV CODE 636 W HCPCS: Performed by: SPECIALIST

## 2021-09-16 PROCEDURE — 80048 BASIC METABOLIC PNL TOTAL CA: CPT | Performed by: SPECIALIST

## 2021-09-16 PROCEDURE — U0002 COVID-19 LAB TEST NON-CDC: HCPCS | Performed by: SPECIALIST

## 2021-09-16 PROCEDURE — 12000002 HC ACUTE/MED SURGE SEMI-PRIVATE ROOM

## 2021-09-16 PROCEDURE — 72200004 HC VAGINAL DELIVERY LEVEL I

## 2021-09-16 PROCEDURE — C1751 CATH, INF, PER/CENT/MIDLINE: HCPCS | Performed by: ANESTHESIOLOGY

## 2021-09-16 PROCEDURE — 36415 COLL VENOUS BLD VENIPUNCTURE: CPT | Performed by: SPECIALIST

## 2021-09-16 RX ORDER — SIMETHICONE 80 MG
1 TABLET,CHEWABLE ORAL 4 TIMES DAILY PRN
Status: DISCONTINUED | OUTPATIENT
Start: 2021-09-16 | End: 2021-09-18 | Stop reason: HOSPADM

## 2021-09-16 RX ORDER — EPHEDRINE SULFATE 50 MG/ML
10 INJECTION, SOLUTION INTRAVENOUS ONCE AS NEEDED
Status: DISCONTINUED | OUTPATIENT
Start: 2021-09-16 | End: 2021-09-18 | Stop reason: HOSPADM

## 2021-09-16 RX ORDER — CALCIUM CARBONATE 200(500)MG
500 TABLET,CHEWABLE ORAL 3 TIMES DAILY PRN
Status: DISCONTINUED | OUTPATIENT
Start: 2021-09-16 | End: 2021-09-18 | Stop reason: HOSPADM

## 2021-09-16 RX ORDER — PROCHLORPERAZINE EDISYLATE 5 MG/ML
5 INJECTION INTRAMUSCULAR; INTRAVENOUS EVERY 6 HOURS PRN
Status: DISCONTINUED | OUTPATIENT
Start: 2021-09-16 | End: 2021-09-18 | Stop reason: HOSPADM

## 2021-09-16 RX ORDER — AMOXICILLIN 250 MG
1 CAPSULE ORAL NIGHTLY
Status: DISCONTINUED | OUTPATIENT
Start: 2021-09-16 | End: 2021-09-18 | Stop reason: HOSPADM

## 2021-09-16 RX ORDER — SODIUM CHLORIDE, SODIUM LACTATE, POTASSIUM CHLORIDE, CALCIUM CHLORIDE 600; 310; 30; 20 MG/100ML; MG/100ML; MG/100ML; MG/100ML
INJECTION, SOLUTION INTRAVENOUS CONTINUOUS
Status: DISCONTINUED | OUTPATIENT
Start: 2021-09-16 | End: 2021-09-18 | Stop reason: HOSPADM

## 2021-09-16 RX ORDER — ONDANSETRON 4 MG/1
8 TABLET, ORALLY DISINTEGRATING ORAL EVERY 8 HOURS PRN
Status: DISCONTINUED | OUTPATIENT
Start: 2021-09-16 | End: 2021-09-18 | Stop reason: HOSPADM

## 2021-09-16 RX ORDER — ONDANSETRON 2 MG/ML
4 INJECTION INTRAMUSCULAR; INTRAVENOUS EVERY 6 HOURS PRN
Status: DISCONTINUED | OUTPATIENT
Start: 2021-09-16 | End: 2021-09-18 | Stop reason: HOSPADM

## 2021-09-16 RX ORDER — DIPHENHYDRAMINE HYDROCHLORIDE 50 MG/ML
12.5 INJECTION INTRAMUSCULAR; INTRAVENOUS EVERY 4 HOURS PRN
Status: DISCONTINUED | OUTPATIENT
Start: 2021-09-16 | End: 2021-09-18 | Stop reason: HOSPADM

## 2021-09-16 RX ORDER — HYDROCORTISONE 25 MG/G
CREAM TOPICAL 3 TIMES DAILY PRN
Status: DISCONTINUED | OUTPATIENT
Start: 2021-09-16 | End: 2021-09-18 | Stop reason: HOSPADM

## 2021-09-16 RX ORDER — OXYCODONE AND ACETAMINOPHEN 10; 325 MG/1; MG/1
1 TABLET ORAL EVERY 4 HOURS PRN
Status: DISCONTINUED | OUTPATIENT
Start: 2021-09-16 | End: 2021-09-18 | Stop reason: HOSPADM

## 2021-09-16 RX ORDER — DIPHENHYDRAMINE HCL 25 MG
25 CAPSULE ORAL EVERY 4 HOURS PRN
Status: DISCONTINUED | OUTPATIENT
Start: 2021-09-16 | End: 2021-09-18 | Stop reason: HOSPADM

## 2021-09-16 RX ORDER — ROPIVACAINE HYDROCHLORIDE 2 MG/ML
INJECTION, SOLUTION EPIDURAL; INFILTRATION
Status: DISCONTINUED | OUTPATIENT
Start: 2021-09-16 | End: 2021-09-16

## 2021-09-16 RX ORDER — DOCUSATE SODIUM 100 MG/1
200 CAPSULE, LIQUID FILLED ORAL 2 TIMES DAILY PRN
Status: DISCONTINUED | OUTPATIENT
Start: 2021-09-16 | End: 2021-09-18 | Stop reason: HOSPADM

## 2021-09-16 RX ORDER — FENTANYL/BUPIVACAINE/NS/PF 2MCG/ML-.1
14 PLASTIC BAG, INJECTION (ML) INJECTION CONTINUOUS
Status: DISCONTINUED | OUTPATIENT
Start: 2021-09-16 | End: 2021-09-18 | Stop reason: HOSPADM

## 2021-09-16 RX ORDER — OXYTOCIN-SODIUM CHLORIDE 0.9% IV SOLN 30 UNIT/500ML 30-0.9/5 UT/ML-%
0-30 SOLUTION INTRAVENOUS CONTINUOUS
Status: DISCONTINUED | OUTPATIENT
Start: 2021-09-16 | End: 2021-09-18 | Stop reason: HOSPADM

## 2021-09-16 RX ORDER — OXYCODONE AND ACETAMINOPHEN 5; 325 MG/1; MG/1
1 TABLET ORAL EVERY 4 HOURS PRN
Status: DISCONTINUED | OUTPATIENT
Start: 2021-09-16 | End: 2021-09-18 | Stop reason: HOSPADM

## 2021-09-16 RX ORDER — BUTORPHANOL TARTRATE 2 MG/ML
1 INJECTION INTRAMUSCULAR; INTRAVENOUS
Status: DISCONTINUED | OUTPATIENT
Start: 2021-09-16 | End: 2021-09-18 | Stop reason: HOSPADM

## 2021-09-16 RX ORDER — SODIUM CHLORIDE 0.9 % (FLUSH) 0.9 %
10 SYRINGE (ML) INJECTION
Status: DISCONTINUED | OUTPATIENT
Start: 2021-09-16 | End: 2021-09-18 | Stop reason: HOSPADM

## 2021-09-16 RX ORDER — PRENATAL WITH FERROUS FUM AND FOLIC ACID 3080; 920; 120; 400; 22; 1.84; 3; 20; 10; 1; 12; 200; 27; 25; 2 [IU]/1; [IU]/1; MG/1; [IU]/1; MG/1; MG/1; MG/1; MG/1; MG/1; MG/1; UG/1; MG/1; MG/1; MG/1; MG/1
1 TABLET ORAL DAILY
Status: DISCONTINUED | OUTPATIENT
Start: 2021-09-16 | End: 2021-09-18 | Stop reason: HOSPADM

## 2021-09-16 RX ORDER — NALOXONE HCL 0.4 MG/ML
0.4 VIAL (ML) INJECTION SEE ADMIN INSTRUCTIONS
Status: DISCONTINUED | OUTPATIENT
Start: 2021-09-16 | End: 2021-09-18 | Stop reason: HOSPADM

## 2021-09-16 RX ORDER — ACETAMINOPHEN 325 MG/1
650 TABLET ORAL EVERY 6 HOURS PRN
Status: DISCONTINUED | OUTPATIENT
Start: 2021-09-16 | End: 2021-09-18 | Stop reason: HOSPADM

## 2021-09-16 RX ADMIN — ACETAMINOPHEN 650 MG: 325 TABLET ORAL at 09:09

## 2021-09-16 RX ADMIN — SODIUM CHLORIDE, SODIUM LACTATE, POTASSIUM CHLORIDE, AND CALCIUM CHLORIDE: .6; .31; .03; .02 INJECTION, SOLUTION INTRAVENOUS at 09:09

## 2021-09-16 RX ADMIN — ROPIVACAINE HYDROCHLORIDE 5 ML: 2 INJECTION, SOLUTION EPIDURAL; INFILTRATION at 09:09

## 2021-09-16 RX ADMIN — OXYTOCIN 2 MILLI-UNITS/MIN: 10 INJECTION INTRAVENOUS at 05:09

## 2021-09-16 RX ADMIN — CALCIUM CARBONATE (ANTACID) CHEW TAB 500 MG 500 MG: 500 CHEW TAB at 02:09

## 2021-09-16 RX ADMIN — OXYCODONE HYDROCHLORIDE AND ACETAMINOPHEN 1 TABLET: 10; 325 TABLET ORAL at 11:09

## 2021-09-16 RX ADMIN — VANCOMYCIN HYDROCHLORIDE 2000 MG: 500 INJECTION, POWDER, LYOPHILIZED, FOR SOLUTION INTRAVENOUS at 04:09

## 2021-09-16 RX ADMIN — VANCOMYCIN HYDROCHLORIDE 2000 MG: 500 INJECTION, POWDER, LYOPHILIZED, FOR SOLUTION INTRAVENOUS at 02:09

## 2021-09-16 RX ADMIN — ROPIVACAINE HYDROCHLORIDE 5 ML: 2 INJECTION, SOLUTION EPIDURAL; INFILTRATION at 03:09

## 2021-09-16 RX ADMIN — LIDOCAINE HYDROCHLORIDE 4 ML: 10; .005 INJECTION, SOLUTION EPIDURAL; INFILTRATION; INTRACAUDAL; PERINEURAL at 09:09

## 2021-09-16 RX ADMIN — CALCIUM CARBONATE (ANTACID) CHEW TAB 500 MG 500 MG: 500 CHEW TAB at 07:09

## 2021-09-16 RX ADMIN — SODIUM CHLORIDE, SODIUM LACTATE, POTASSIUM CHLORIDE, AND CALCIUM CHLORIDE 1000 ML: .6; .31; .03; .02 INJECTION, SOLUTION INTRAVENOUS at 08:09

## 2021-09-16 RX ADMIN — SENNOSIDES AND DOCUSATE SODIUM 1 TABLET: 8.6; 5 TABLET ORAL at 08:09

## 2021-09-17 LAB
BASOPHILS # BLD AUTO: 0.03 K/UL (ref 0–0.2)
BASOPHILS NFR BLD: 0.3 % (ref 0–1.9)
BILIRUBINOMETRY INDEX: 4.6
DIFFERENTIAL METHOD: ABNORMAL
EOSINOPHIL # BLD AUTO: 0.2 K/UL (ref 0–0.5)
EOSINOPHIL NFR BLD: 1.8 % (ref 0–8)
ERYTHROCYTE [DISTWIDTH] IN BLOOD BY AUTOMATED COUNT: 14.9 % (ref 11.5–14.5)
HCT VFR BLD AUTO: 30.1 % (ref 37–48.5)
HGB BLD-MCNC: 9.5 G/DL (ref 12–16)
IMM GRANULOCYTES # BLD AUTO: 0.08 K/UL (ref 0–0.04)
IMM GRANULOCYTES NFR BLD AUTO: 0.7 % (ref 0–0.5)
LYMPHOCYTES # BLD AUTO: 1.9 K/UL (ref 1–4.8)
LYMPHOCYTES NFR BLD: 16.4 % (ref 18–48)
MCH RBC QN AUTO: 27.4 PG (ref 27–31)
MCHC RBC AUTO-ENTMCNC: 31.6 G/DL (ref 32–36)
MCV RBC AUTO: 87 FL (ref 82–98)
MONOCYTES # BLD AUTO: 1.1 K/UL (ref 0.3–1)
MONOCYTES NFR BLD: 9.9 % (ref 4–15)
NEUTROPHILS # BLD AUTO: 8.1 K/UL (ref 1.8–7.7)
NEUTROPHILS NFR BLD: 70.9 % (ref 38–73)
NRBC BLD-RTO: 0 /100 WBC
PLATELET # BLD AUTO: 260 K/UL (ref 150–450)
PMV BLD AUTO: 10.2 FL (ref 9.2–12.9)
RBC # BLD AUTO: 3.47 M/UL (ref 4–5.4)
WBC # BLD AUTO: 11.37 K/UL (ref 3.9–12.7)

## 2021-09-17 PROCEDURE — 36415 COLL VENOUS BLD VENIPUNCTURE: CPT | Performed by: SPECIALIST

## 2021-09-17 PROCEDURE — 25000003 PHARM REV CODE 250: Performed by: SPECIALIST

## 2021-09-17 PROCEDURE — 85025 COMPLETE CBC W/AUTO DIFF WBC: CPT | Performed by: SPECIALIST

## 2021-09-17 PROCEDURE — 12000002 HC ACUTE/MED SURGE SEMI-PRIVATE ROOM

## 2021-09-17 RX ADMIN — SENNOSIDES AND DOCUSATE SODIUM 1 TABLET: 8.6; 5 TABLET ORAL at 11:09

## 2021-09-17 RX ADMIN — OXYCODONE HYDROCHLORIDE AND ACETAMINOPHEN 1 TABLET: 10; 325 TABLET ORAL at 03:09

## 2021-09-17 RX ADMIN — OXYCODONE HYDROCHLORIDE AND ACETAMINOPHEN 1 TABLET: 10; 325 TABLET ORAL at 11:09

## 2021-09-17 RX ADMIN — PRENATAL VIT W/ FE FUMARATE-FA TAB 27-0.8 MG 1 TABLET: 27-0.8 TAB at 11:09

## 2021-09-17 RX ADMIN — OXYCODONE HYDROCHLORIDE AND ACETAMINOPHEN 1 TABLET: 10; 325 TABLET ORAL at 05:09

## 2021-09-17 RX ADMIN — DOCUSATE SODIUM 200 MG: 100 CAPSULE, LIQUID FILLED ORAL at 11:09

## 2021-09-17 RX ADMIN — IBUPROFEN 600 MG: 200 TABLET, FILM COATED ORAL at 05:09

## 2021-09-18 VITALS
WEIGHT: 214 LBS | BODY MASS INDEX: 36.54 KG/M2 | TEMPERATURE: 98 F | SYSTOLIC BLOOD PRESSURE: 116 MMHG | DIASTOLIC BLOOD PRESSURE: 77 MMHG | OXYGEN SATURATION: 99 % | RESPIRATION RATE: 16 BRPM | HEIGHT: 64 IN | HEART RATE: 98 BPM

## 2021-09-18 PROCEDURE — 63600175 PHARM REV CODE 636 W HCPCS: Performed by: SPECIALIST

## 2021-09-18 PROCEDURE — 25000003 PHARM REV CODE 250: Performed by: SPECIALIST

## 2021-09-18 PROCEDURE — 90471 IMMUNIZATION ADMIN: CPT | Performed by: SPECIALIST

## 2021-09-18 PROCEDURE — 90715 TDAP VACCINE 7 YRS/> IM: CPT | Performed by: SPECIALIST

## 2021-09-18 RX ORDER — OXYCODONE AND ACETAMINOPHEN 5; 325 MG/1; MG/1
1 TABLET ORAL EVERY 4 HOURS PRN
Qty: 10 TABLET | Refills: 0 | Status: SHIPPED | OUTPATIENT
Start: 2021-09-18

## 2021-09-18 RX ADMIN — DOCUSATE SODIUM 200 MG: 100 CAPSULE, LIQUID FILLED ORAL at 09:09

## 2021-09-18 RX ADMIN — PRENATAL VIT W/ FE FUMARATE-FA TAB 27-0.8 MG 1 TABLET: 27-0.8 TAB at 09:09

## 2021-09-18 RX ADMIN — IBUPROFEN 600 MG: 200 TABLET, FILM COATED ORAL at 09:09

## 2021-09-18 RX ADMIN — CLOSTRIDIUM TETANI TOXOID ANTIGEN (FORMALDEHYDE INACTIVATED), CORYNEBACTERIUM DIPHTHERIAE TOXOID ANTIGEN (FORMALDEHYDE INACTIVATED), BORDETELLA PERTUSSIS TOXOID ANTIGEN (GLUTARALDEHYDE INACTIVATED), BORDETELLA PERTUSSIS FILAMENTOUS HEMAGGLUTININ ANTIGEN (FORMALDEHYDE INACTIVATED), BORDETELLA PERTUSSIS PERTACTIN ANTIGEN, AND BORDETELLA PERTUSSIS FIMBRIAE 2/3 ANTIGEN 0.5 ML: 5; 2; 2.5; 5; 3; 5 INJECTION, SUSPENSION INTRAMUSCULAR at 11:09

## 2024-02-26 LAB
C TRACH RRNA SPEC QL PROBE: NEGATIVE
GONORRHEA: NEGATIVE
HBV SURFACE AG SERPL QL IA: NEGATIVE
HCV AB SERPL QL IA: NEGATIVE
HIV 1+2 AB+HIV1 P24 AG SERPL QL IA: NON REACTIVE
RPR: NON REACTIVE
RUBELLA IMMUNE STATUS: NORMAL

## 2024-05-28 LAB — RPR: NON REACTIVE

## 2024-07-17 LAB — PRENATAL STREP B CULTURE: POSITIVE

## 2024-08-07 DIAGNOSIS — Z34.90 ENCOUNTER FOR ELECTIVE INDUCTION OF LABOR: Primary | ICD-10-CM

## 2024-08-18 ENCOUNTER — HOSPITAL ENCOUNTER (INPATIENT)
Facility: HOSPITAL | Age: 34
LOS: 3 days | Discharge: HOME | End: 2024-08-21
Attending: SPECIALIST | Admitting: SPECIALIST
Payer: MEDICAID

## 2024-08-18 DIAGNOSIS — O36.8390 MATERNAL CARE FOR FETAL DECELERATIONS DURING PREGNANCY: ICD-10-CM

## 2024-08-18 DIAGNOSIS — Z34.90 ENCOUNTER FOR ELECTIVE INDUCTION OF LABOR: Primary | ICD-10-CM

## 2024-08-18 DIAGNOSIS — Z34.90 PREGNANCY: ICD-10-CM

## 2024-08-18 LAB
ABO + RH BLD: NORMAL
AMPHET+METHAMPHET UR QL: NEGATIVE
BARBITURATES UR QL SCN>200 NG/ML: NEGATIVE
BASOPHILS # BLD AUTO: 0.02 K/UL (ref 0–0.2)
BASOPHILS NFR BLD: 0.2 % (ref 0–1.9)
BENZODIAZ UR QL SCN>200 NG/ML: NEGATIVE
BILIRUB UR QL STRIP: NEGATIVE
BLD GP AB SCN CELLS X3 SERPL QL: NORMAL
BUPRENORPHINE UR QL: NEGATIVE
BZE UR QL SCN: NEGATIVE
CANNABINOIDS UR QL SCN: NEGATIVE
CLARITY UR: CLEAR
COLOR UR: YELLOW
CREAT UR-MCNC: 120.8 MG/DL (ref 15–325)
DIFFERENTIAL METHOD BLD: ABNORMAL
EOSINOPHIL # BLD AUTO: 0.2 K/UL (ref 0–0.5)
EOSINOPHIL NFR BLD: 2.2 % (ref 0–8)
ERYTHROCYTE [DISTWIDTH] IN BLOOD BY AUTOMATED COUNT: 15 % (ref 11.5–14.5)
FENTANYL UR QL SCN: NORMAL
GLUCOSE UR QL STRIP: NEGATIVE
HCT VFR BLD AUTO: 31.7 % (ref 37–48.5)
HGB BLD-MCNC: 10.6 G/DL (ref 12–16)
HGB UR QL STRIP: NEGATIVE
IMM GRANULOCYTES # BLD AUTO: 0.1 K/UL (ref 0–0.04)
IMM GRANULOCYTES NFR BLD AUTO: 1 % (ref 0–0.5)
KETONES UR QL STRIP: NEGATIVE
LEUKOCYTE ESTERASE UR QL STRIP: NEGATIVE
LYMPHOCYTES # BLD AUTO: 2.3 K/UL (ref 1–4.8)
LYMPHOCYTES NFR BLD: 24.4 % (ref 18–48)
MCH RBC QN AUTO: 29.2 PG (ref 27–31)
MCHC RBC AUTO-ENTMCNC: 33.4 G/DL (ref 32–36)
MCV RBC AUTO: 87 FL (ref 82–98)
MONOCYTES # BLD AUTO: 0.9 K/UL (ref 0.3–1)
MONOCYTES NFR BLD: 9.8 % (ref 4–15)
NEUTROPHILS # BLD AUTO: 6 K/UL (ref 1.8–7.7)
NEUTROPHILS NFR BLD: 62.4 % (ref 38–73)
NITRITE UR QL STRIP: NEGATIVE
NRBC BLD-RTO: 0 /100 WBC
OPIATES UR QL SCN: NEGATIVE
PCP UR QL SCN>25 NG/ML: NEGATIVE
PH UR STRIP: 7 [PH] (ref 5–8)
PLATELET # BLD AUTO: 261 K/UL (ref 150–450)
PMV BLD AUTO: 10.1 FL (ref 9.2–12.9)
PROT UR QL STRIP: ABNORMAL
RBC # BLD AUTO: 3.63 M/UL (ref 4–5.4)
SP GR UR STRIP: 1.02 (ref 1–1.03)
TOXICOLOGY INFORMATION: NORMAL
URN SPEC COLLECT METH UR: ABNORMAL
UROBILINOGEN UR STRIP-ACNC: NEGATIVE EU/DL
WBC # BLD AUTO: 9.59 K/UL (ref 3.9–12.7)

## 2024-08-18 PROCEDURE — 80307 DRUG TEST PRSMV CHEM ANLYZR: CPT | Performed by: SPECIALIST

## 2024-08-18 PROCEDURE — 63600175 PHARM REV CODE 636 W HCPCS: Performed by: SPECIALIST

## 2024-08-18 PROCEDURE — 80354 DRUG SCREENING FENTANYL: CPT | Performed by: SPECIALIST

## 2024-08-18 PROCEDURE — 85025 COMPLETE CBC W/AUTO DIFF WBC: CPT | Performed by: SPECIALIST

## 2024-08-18 PROCEDURE — 86901 BLOOD TYPING SEROLOGIC RH(D): CPT | Performed by: SPECIALIST

## 2024-08-18 PROCEDURE — 86593 SYPHILIS TEST NON-TREP QUANT: CPT | Performed by: SPECIALIST

## 2024-08-18 PROCEDURE — 81003 URINALYSIS AUTO W/O SCOPE: CPT | Mod: 59 | Performed by: SPECIALIST

## 2024-08-18 PROCEDURE — 86900 BLOOD TYPING SEROLOGIC ABO: CPT | Performed by: SPECIALIST

## 2024-08-18 PROCEDURE — 86850 RBC ANTIBODY SCREEN: CPT | Performed by: SPECIALIST

## 2024-08-18 PROCEDURE — 12000002 HC ACUTE/MED SURGE SEMI-PRIVATE ROOM

## 2024-08-18 PROCEDURE — 80348 DRUG SCREENING BUPRENORPHINE: CPT | Performed by: SPECIALIST

## 2024-08-18 PROCEDURE — 99211 OFF/OP EST MAY X REQ PHY/QHP: CPT

## 2024-08-18 RX ORDER — CEFAZOLIN SODIUM 2 G/50ML
2 SOLUTION INTRAVENOUS ONCE
Status: COMPLETED | OUTPATIENT
Start: 2024-08-19 | End: 2024-08-19

## 2024-08-18 RX ORDER — BUTORPHANOL TARTRATE 2 MG/ML
1 INJECTION INTRAMUSCULAR; INTRAVENOUS
Status: DISCONTINUED | OUTPATIENT
Start: 2024-08-18 | End: 2024-08-21 | Stop reason: HOSPADM

## 2024-08-18 RX ORDER — CARBOPROST TROMETHAMINE 250 UG/ML
250 INJECTION, SOLUTION INTRAMUSCULAR
Status: DISCONTINUED | OUTPATIENT
Start: 2024-08-18 | End: 2024-08-19

## 2024-08-18 RX ORDER — MISOPROSTOL 100 MCG
25 TABLET ORAL EVERY 4 HOURS
Status: DISCONTINUED | OUTPATIENT
Start: 2024-08-19 | End: 2024-08-18

## 2024-08-18 RX ORDER — OXYTOCIN-SODIUM CHLORIDE 0.9% IV SOLN 30 UNIT/500ML 30-0.9/5 UT/ML-%
10 SOLUTION INTRAVENOUS ONCE AS NEEDED
Status: DISCONTINUED | OUTPATIENT
Start: 2024-08-18 | End: 2024-08-19

## 2024-08-18 RX ORDER — OXYTOCIN-SODIUM CHLORIDE 0.9% IV SOLN 30 UNIT/500ML 30-0.9/5 UT/ML-%
95 SOLUTION INTRAVENOUS ONCE AS NEEDED
Status: DISCONTINUED | OUTPATIENT
Start: 2024-08-18 | End: 2024-08-19

## 2024-08-18 RX ORDER — MISOPROSTOL 200 UG/1
800 TABLET ORAL ONCE AS NEEDED
Status: DISCONTINUED | OUTPATIENT
Start: 2024-08-18 | End: 2024-08-19

## 2024-08-18 RX ORDER — SIMETHICONE 80 MG
1 TABLET,CHEWABLE ORAL 4 TIMES DAILY PRN
Status: DISCONTINUED | OUTPATIENT
Start: 2024-08-18 | End: 2024-08-19

## 2024-08-18 RX ORDER — ONDANSETRON 4 MG/1
8 TABLET, ORALLY DISINTEGRATING ORAL EVERY 8 HOURS PRN
Status: DISCONTINUED | OUTPATIENT
Start: 2024-08-18 | End: 2024-08-19

## 2024-08-18 RX ORDER — OXYTOCIN 10 [USP'U]/ML
10 INJECTION, SOLUTION INTRAMUSCULAR; INTRAVENOUS ONCE AS NEEDED
Status: DISCONTINUED | OUTPATIENT
Start: 2024-08-18 | End: 2024-08-19

## 2024-08-18 RX ORDER — ROPIVACAINE HYDROCHLORIDE 2 MG/ML
20 INJECTION, SOLUTION EPIDURAL; INFILTRATION ONCE AS NEEDED
Status: DISCONTINUED | OUTPATIENT
Start: 2024-08-18 | End: 2024-08-21 | Stop reason: HOSPADM

## 2024-08-18 RX ORDER — SODIUM CHLORIDE, SODIUM LACTATE, POTASSIUM CHLORIDE, CALCIUM CHLORIDE 600; 310; 30; 20 MG/100ML; MG/100ML; MG/100ML; MG/100ML
INJECTION, SOLUTION INTRAVENOUS CONTINUOUS
Status: DISCONTINUED | OUTPATIENT
Start: 2024-08-18 | End: 2024-08-19

## 2024-08-18 RX ORDER — METHYLERGONOVINE MALEATE 0.2 MG/ML
200 INJECTION INTRAVENOUS ONCE AS NEEDED
Status: DISCONTINUED | OUTPATIENT
Start: 2024-08-18 | End: 2024-08-19

## 2024-08-18 RX ORDER — ZOLPIDEM TARTRATE 5 MG/1
5 TABLET ORAL NIGHTLY PRN
Status: DISCONTINUED | OUTPATIENT
Start: 2024-08-18 | End: 2024-08-21 | Stop reason: HOSPADM

## 2024-08-18 RX ORDER — LIDOCAINE HYDROCHLORIDE 10 MG/ML
10 INJECTION, SOLUTION INFILTRATION; PERINEURAL ONCE AS NEEDED
Status: DISCONTINUED | OUTPATIENT
Start: 2024-08-18 | End: 2024-08-19

## 2024-08-18 RX ORDER — CEFAZOLIN SODIUM 1 G/50ML
1 SOLUTION INTRAVENOUS
Status: DISCONTINUED | OUTPATIENT
Start: 2024-08-19 | End: 2024-08-19

## 2024-08-18 RX ORDER — MUPIROCIN 20 MG/G
OINTMENT TOPICAL
Status: DISCONTINUED | OUTPATIENT
Start: 2024-08-18 | End: 2024-08-19

## 2024-08-18 RX ORDER — MISOPROSTOL 100 MCG
25 TABLET ORAL EVERY 4 HOURS PRN
Status: DISCONTINUED | OUTPATIENT
Start: 2024-08-19 | End: 2024-08-19

## 2024-08-18 RX ORDER — CALCIUM CARBONATE 200(500)MG
500 TABLET,CHEWABLE ORAL 3 TIMES DAILY PRN
Status: DISCONTINUED | OUTPATIENT
Start: 2024-08-18 | End: 2024-08-19

## 2024-08-18 RX ORDER — FENTANYL/BUPIVACAINE/NS/PF 2MCG/ML-.1
PLASTIC BAG, INJECTION (ML) INJECTION
Status: DISCONTINUED | OUTPATIENT
Start: 2024-08-18 | End: 2024-08-21 | Stop reason: HOSPADM

## 2024-08-18 RX ORDER — TRANEXAMIC ACID 10 MG/ML
1000 INJECTION, SOLUTION INTRAVENOUS EVERY 30 MIN PRN
Status: DISCONTINUED | OUTPATIENT
Start: 2024-08-18 | End: 2024-08-19

## 2024-08-18 RX ORDER — SODIUM CHLORIDE 9 MG/ML
INJECTION, SOLUTION INTRAVENOUS
Status: DISCONTINUED | OUTPATIENT
Start: 2024-08-18 | End: 2024-08-19

## 2024-08-18 RX ORDER — DIPHENOXYLATE HYDROCHLORIDE AND ATROPINE SULFATE 2.5; .025 MG/1; MG/1
2 TABLET ORAL EVERY 6 HOURS PRN
Status: DISCONTINUED | OUTPATIENT
Start: 2024-08-18 | End: 2024-08-19

## 2024-08-18 RX ADMIN — SODIUM CHLORIDE, SODIUM LACTATE, POTASSIUM CHLORIDE, AND CALCIUM CHLORIDE 500 ML: .6; .31; .03; .02 INJECTION, SOLUTION INTRAVENOUS at 09:08

## 2024-08-19 ENCOUNTER — ANESTHESIA EVENT (OUTPATIENT)
Dept: OBSTETRICS AND GYNECOLOGY | Facility: HOSPITAL | Age: 34
End: 2024-08-19
Payer: MEDICAID

## 2024-08-19 ENCOUNTER — ANESTHESIA (OUTPATIENT)
Dept: OBSTETRICS AND GYNECOLOGY | Facility: HOSPITAL | Age: 34
End: 2024-08-19
Payer: MEDICAID

## 2024-08-19 PROBLEM — O36.8390 MATERNAL CARE FOR FETAL DECELERATIONS DURING PREGNANCY: Status: ACTIVE | Noted: 2024-08-19

## 2024-08-19 LAB — TREPONEMA PALLIDUM IGG+IGM AB [PRESENCE] IN SERUM OR PLASMA BY IMMUNOASSAY: NONREACTIVE

## 2024-08-19 PROCEDURE — 63600175 PHARM REV CODE 636 W HCPCS: Performed by: SPECIALIST

## 2024-08-19 PROCEDURE — 63600175 PHARM REV CODE 636 W HCPCS: Performed by: STUDENT IN AN ORGANIZED HEALTH CARE EDUCATION/TRAINING PROGRAM

## 2024-08-19 PROCEDURE — 25000003 PHARM REV CODE 250: Performed by: SPECIALIST

## 2024-08-19 PROCEDURE — 10907ZC DRAINAGE OF AMNIOTIC FLUID, THERAPEUTIC FROM PRODUCTS OF CONCEPTION, VIA NATURAL OR ARTIFICIAL OPENING: ICD-10-PCS | Performed by: SPECIALIST

## 2024-08-19 PROCEDURE — 12000002 HC ACUTE/MED SURGE SEMI-PRIVATE ROOM

## 2024-08-19 PROCEDURE — C1751 CATH, INF, PER/CENT/MIDLINE: HCPCS | Performed by: STUDENT IN AN ORGANIZED HEALTH CARE EDUCATION/TRAINING PROGRAM

## 2024-08-19 PROCEDURE — 25000003 PHARM REV CODE 250: Performed by: OBSTETRICS & GYNECOLOGY

## 2024-08-19 PROCEDURE — 72200005 HC VAGINAL DELIVERY LEVEL II

## 2024-08-19 PROCEDURE — 0HQ9XZZ REPAIR PERINEUM SKIN, EXTERNAL APPROACH: ICD-10-PCS | Performed by: SPECIALIST

## 2024-08-19 PROCEDURE — 3E0DXGC INTRODUCTION OF OTHER THERAPEUTIC SUBSTANCE INTO MOUTH AND PHARYNX, EXTERNAL APPROACH: ICD-10-PCS | Performed by: SPECIALIST

## 2024-08-19 PROCEDURE — 62326 NJX INTERLAMINAR LMBR/SAC: CPT | Performed by: STUDENT IN AN ORGANIZED HEALTH CARE EDUCATION/TRAINING PROGRAM

## 2024-08-19 PROCEDURE — 51702 INSERT TEMP BLADDER CATH: CPT

## 2024-08-19 PROCEDURE — 27200710 HC EPIDURAL INFUSION PUMP SET: Performed by: STUDENT IN AN ORGANIZED HEALTH CARE EDUCATION/TRAINING PROGRAM

## 2024-08-19 RX ORDER — DOCUSATE SODIUM 100 MG/1
200 CAPSULE, LIQUID FILLED ORAL 2 TIMES DAILY PRN
Status: DISCONTINUED | OUTPATIENT
Start: 2024-08-19 | End: 2024-08-21 | Stop reason: HOSPADM

## 2024-08-19 RX ORDER — AMOXICILLIN 250 MG
1 CAPSULE ORAL NIGHTLY
Status: DISCONTINUED | OUTPATIENT
Start: 2024-08-19 | End: 2024-08-21 | Stop reason: HOSPADM

## 2024-08-19 RX ORDER — MISOPROSTOL 200 UG/1
800 TABLET ORAL ONCE AS NEEDED
Status: DISCONTINUED | OUTPATIENT
Start: 2024-08-19 | End: 2024-08-21 | Stop reason: HOSPADM

## 2024-08-19 RX ORDER — HYDROCORTISONE 25 MG/G
CREAM TOPICAL 3 TIMES DAILY PRN
Status: DISCONTINUED | OUTPATIENT
Start: 2024-08-19 | End: 2024-08-21 | Stop reason: HOSPADM

## 2024-08-19 RX ORDER — ONDANSETRON 4 MG/1
8 TABLET, ORALLY DISINTEGRATING ORAL EVERY 8 HOURS PRN
Status: DISCONTINUED | OUTPATIENT
Start: 2024-08-19 | End: 2024-08-21 | Stop reason: HOSPADM

## 2024-08-19 RX ORDER — DIPHENOXYLATE HYDROCHLORIDE AND ATROPINE SULFATE 2.5; .025 MG/1; MG/1
2 TABLET ORAL EVERY 6 HOURS PRN
Status: DISCONTINUED | OUTPATIENT
Start: 2024-08-19 | End: 2024-08-21 | Stop reason: HOSPADM

## 2024-08-19 RX ORDER — FENTANYL/BUPIVACAINE/NS/PF 2MCG/ML-.1
14 PLASTIC BAG, INJECTION (ML) INJECTION CONTINUOUS
Status: DISCONTINUED | OUTPATIENT
Start: 2024-08-19 | End: 2024-08-19

## 2024-08-19 RX ORDER — DIPHENHYDRAMINE HCL 25 MG
25 CAPSULE ORAL EVERY 4 HOURS PRN
Status: DISCONTINUED | OUTPATIENT
Start: 2024-08-19 | End: 2024-08-21 | Stop reason: HOSPADM

## 2024-08-19 RX ORDER — TRANEXAMIC ACID 10 MG/ML
1000 INJECTION, SOLUTION INTRAVENOUS EVERY 30 MIN PRN
Status: DISCONTINUED | OUTPATIENT
Start: 2024-08-19 | End: 2024-08-21 | Stop reason: HOSPADM

## 2024-08-19 RX ORDER — EPHEDRINE SULFATE 50 MG/ML
10 INJECTION, SOLUTION INTRAVENOUS ONCE AS NEEDED
Status: DISCONTINUED | OUTPATIENT
Start: 2024-08-19 | End: 2024-08-19

## 2024-08-19 RX ORDER — TERBUTALINE SULFATE 1 MG/ML
0.12 INJECTION SUBCUTANEOUS
Status: DISCONTINUED | OUTPATIENT
Start: 2024-08-19 | End: 2024-08-19

## 2024-08-19 RX ORDER — OXYTOCIN 10 [USP'U]/ML
10 INJECTION, SOLUTION INTRAMUSCULAR; INTRAVENOUS ONCE AS NEEDED
Status: DISCONTINUED | OUTPATIENT
Start: 2024-08-19 | End: 2024-08-21 | Stop reason: HOSPADM

## 2024-08-19 RX ORDER — DIPHENHYDRAMINE HYDROCHLORIDE 50 MG/ML
12.5 INJECTION INTRAMUSCULAR; INTRAVENOUS EVERY 4 HOURS PRN
Status: DISCONTINUED | OUTPATIENT
Start: 2024-08-19 | End: 2024-08-19

## 2024-08-19 RX ORDER — OXYCODONE AND ACETAMINOPHEN 10; 325 MG/1; MG/1
1 TABLET ORAL EVERY 4 HOURS PRN
Status: DISCONTINUED | OUTPATIENT
Start: 2024-08-19 | End: 2024-08-21 | Stop reason: HOSPADM

## 2024-08-19 RX ORDER — PRENATAL WITH FERROUS FUM AND FOLIC ACID 3080; 920; 120; 400; 22; 1.84; 3; 20; 10; 1; 12; 200; 27; 25; 2 [IU]/1; [IU]/1; MG/1; [IU]/1; MG/1; MG/1; MG/1; MG/1; MG/1; MG/1; UG/1; MG/1; MG/1; MG/1; MG/1
1 TABLET ORAL DAILY
Status: DISCONTINUED | OUTPATIENT
Start: 2024-08-19 | End: 2024-08-21 | Stop reason: HOSPADM

## 2024-08-19 RX ORDER — OXYTOCIN-SODIUM CHLORIDE 0.9% IV SOLN 30 UNIT/500ML 30-0.9/5 UT/ML-%
95 SOLUTION INTRAVENOUS ONCE AS NEEDED
Status: DISCONTINUED | OUTPATIENT
Start: 2024-08-19 | End: 2024-08-21 | Stop reason: HOSPADM

## 2024-08-19 RX ORDER — CARBOPROST TROMETHAMINE 250 UG/ML
250 INJECTION, SOLUTION INTRAMUSCULAR
Status: DISCONTINUED | OUTPATIENT
Start: 2024-08-19 | End: 2024-08-21 | Stop reason: HOSPADM

## 2024-08-19 RX ORDER — OXYTOCIN-SODIUM CHLORIDE 0.9% IV SOLN 30 UNIT/500ML 30-0.9/5 UT/ML-%
95 SOLUTION INTRAVENOUS ONCE
Status: DISCONTINUED | OUTPATIENT
Start: 2024-08-19 | End: 2024-08-21 | Stop reason: HOSPADM

## 2024-08-19 RX ORDER — SODIUM CHLORIDE 0.9 % (FLUSH) 0.9 %
10 SYRINGE (ML) INJECTION
Status: DISCONTINUED | OUTPATIENT
Start: 2024-08-19 | End: 2024-08-21 | Stop reason: HOSPADM

## 2024-08-19 RX ORDER — OXYTOCIN-SODIUM CHLORIDE 0.9% IV SOLN 30 UNIT/500ML 30-0.9/5 UT/ML-%
10 SOLUTION INTRAVENOUS ONCE AS NEEDED
Status: DISCONTINUED | OUTPATIENT
Start: 2024-08-19 | End: 2024-08-21 | Stop reason: HOSPADM

## 2024-08-19 RX ORDER — ONDANSETRON HYDROCHLORIDE 2 MG/ML
4 INJECTION, SOLUTION INTRAVENOUS EVERY 6 HOURS PRN
Status: DISCONTINUED | OUTPATIENT
Start: 2024-08-19 | End: 2024-08-19

## 2024-08-19 RX ORDER — TERBUTALINE SULFATE 1 MG/ML
0.25 INJECTION SUBCUTANEOUS
Status: DISCONTINUED | OUTPATIENT
Start: 2024-08-19 | End: 2024-08-21 | Stop reason: HOSPADM

## 2024-08-19 RX ORDER — NALOXONE HCL 0.4 MG/ML
0.4 VIAL (ML) INJECTION SEE ADMIN INSTRUCTIONS
Status: DISCONTINUED | OUTPATIENT
Start: 2024-08-19 | End: 2024-08-19

## 2024-08-19 RX ORDER — ACETAMINOPHEN 325 MG/1
650 TABLET ORAL EVERY 6 HOURS SCHEDULED
Status: DISCONTINUED | OUTPATIENT
Start: 2024-08-19 | End: 2024-08-21 | Stop reason: HOSPADM

## 2024-08-19 RX ORDER — OXYCODONE AND ACETAMINOPHEN 5; 325 MG/1; MG/1
1 TABLET ORAL EVERY 4 HOURS PRN
Status: DISCONTINUED | OUTPATIENT
Start: 2024-08-19 | End: 2024-08-21 | Stop reason: HOSPADM

## 2024-08-19 RX ORDER — METHYLERGONOVINE MALEATE 0.2 MG/ML
200 INJECTION INTRAVENOUS ONCE AS NEEDED
Status: DISCONTINUED | OUTPATIENT
Start: 2024-08-19 | End: 2024-08-21 | Stop reason: HOSPADM

## 2024-08-19 RX ORDER — TERBUTALINE SULFATE 1 MG/ML
0.25 INJECTION SUBCUTANEOUS ONCE AS NEEDED
Status: COMPLETED | OUTPATIENT
Start: 2024-08-19 | End: 2024-08-19

## 2024-08-19 RX ADMIN — SODIUM CHLORIDE, POTASSIUM CHLORIDE, SODIUM LACTATE AND CALCIUM CHLORIDE: 600; 310; 30; 20 INJECTION, SOLUTION INTRAVENOUS at 08:08

## 2024-08-19 RX ADMIN — TERBUTALINE SULFATE 0.25 MG: 1 INJECTION SUBCUTANEOUS at 09:08

## 2024-08-19 RX ADMIN — TERBUTALINE SULFATE 0.25 MG: 1 INJECTION, SOLUTION SUBCUTANEOUS at 06:08

## 2024-08-19 RX ADMIN — SENNOSIDES AND DOCUSATE SODIUM 1 TABLET: 50; 8.6 TABLET ORAL at 09:08

## 2024-08-19 RX ADMIN — MISOPROSTOL 25 MCG: 100 TABLET ORAL at 12:08

## 2024-08-19 RX ADMIN — OXYCODONE HYDROCHLORIDE AND ACETAMINOPHEN 1 TABLET: 10; 325 TABLET ORAL at 06:08

## 2024-08-19 RX ADMIN — MISOPROSTOL 25 MCG: 100 TABLET ORAL at 05:08

## 2024-08-19 RX ADMIN — ZOLPIDEM TARTRATE 5 MG: 5 TABLET, COATED ORAL at 12:08

## 2024-08-19 RX ADMIN — IBUPROFEN 600 MG: 200 TABLET, FILM COATED ORAL at 07:08

## 2024-08-19 RX ADMIN — Medication 10 UNITS: at 12:08

## 2024-08-19 RX ADMIN — ONDANSETRON 4 MG: 2 INJECTION INTRAMUSCULAR; INTRAVENOUS at 12:08

## 2024-08-19 RX ADMIN — SODIUM CHLORIDE, POTASSIUM CHLORIDE, SODIUM LACTATE AND CALCIUM CHLORIDE 250 ML: 600; 310; 30; 20 INJECTION, SOLUTION INTRAVENOUS at 06:08

## 2024-08-19 RX ADMIN — CEFAZOLIN SODIUM 1 G: 1 SOLUTION INTRAVENOUS at 08:08

## 2024-08-19 RX ADMIN — CEFAZOLIN SODIUM 2 G: 2 SOLUTION INTRAVENOUS at 12:08

## 2024-08-19 NOTE — PLAN OF CARE
[facility]  Discharge Assessment    Primary Care Provider: No, Primary Doctor     OB Screen completed and no needs identified at this time.  White board in room updated with contact information, and mother was encouraged to contact office if further needs arise.    OB Screen (most recent)       OB Screen - 24 1604          OB SCREEN    Assessment Type Discharge Planning Assessment     Source of Information health record     Received Prenatal Care Yes     Any indications/suspicions for None     Is this a teen pregnancy No     Is the baby in NICU No     Indication for adoption/Safe Haven No     Indication for DME/post-acute needs No     HIV (+) No     Any congenital  disorders No     Fetal demise/ death No

## 2024-08-19 NOTE — NURSING
OBGYN LABS ENTERED INTO RESULTS CONSOLE      1st Trimester Labs Entered Into Results Console     [] AOBRH   [x] Rubella Immune   [x] RPR   [x] HBsAg   [x] HIV   [x] Chlamydia   [x] Gonorrhea   [] Cell-Free DNA   [x] Hep-C   [] Varicella    2nd Trimester Labs Entered Into Results Console     []OB Glucose Screen      3rd Trimester Labs Entered Into Results Console      [x] GBS   [] RPR    Drug Screen Entered Into Results Console     [] Benzodiazes   [] Methadone   [] Cocaine (Metab)   [] Opiate   [] Amphetamine   [] Marijuana   [] Creatinine   [] Amphetamines-Beaker   [] Barbituates-Beaker   [] Benzodiazepine-Beaker   [] Cannabinoids-Beaker   [] Cocaine-Beaker   [] Fentanyl-Beaker   [] MDMA-Beaker   [] Opiates-Beaker   [] Phencyclidine-Beaker        Results Entered by Hyacinth Wade RN    Results Verified for Manual Entry Accuracy by Jacquelin BOYCE

## 2024-08-19 NOTE — LACTATION NOTE
This note was copied from a baby's chart.  Mom reports baby breastfeed well after delivery. Discussed feeding cues & feeding frequency. Mom denies any questions/concerns or assistance @ this time. Assistance offered prn. Mom verbalized understanding

## 2024-08-19 NOTE — ASSESSMENT & PLAN NOTE
Intrauterine pregnancy at 40 weeks 1 day gestation   Labor induction secondary to decreased fetal movement fetal variable decelerations and patient appeared to be maybe already in early labor   Group B Streptococcus positive she has been receiving Ancef as there is a questionable reported allergy to penicillin the eye was unaware of.  Patient received 1st dose of Cytotec and then did receive 2nd dose of Cytotec and appearing to have some hyperstimulation and tachy systole.  She has received 1 dose of terbutaline she has been fluid bolused and position changes have been performed.  We are going to go ahead and give another dose of terbutaline.  The fetal heart tones look reassuring with good variability.  
Size Of Lesion In Cm (Optional): 0
Detail Level: Detailed

## 2024-08-19 NOTE — NURSING
[facility]  Department of OBGYN  OB Summary        PATIENT NAME: Sara Arellano                                                                                                                                                                       AGE: 33 y.o.                                                                                          MRN: [id number]  PATIENT LOCATION:  [facility]  ADMIT DATE: 2024  TODAY'S DATE: 2024 Hospital Day: 2  GEORGI: Estimated Date of Delivery: 24  GA: 40w1d     OB HISTORY:    OB History    Para Term  AB Living   4 4 4 0 0 4   SAB IAB Ectopic Multiple Live Births   0 0 0 0 4      # Outcome Date GA Lbr Roman/2nd Weight Sex Type Anes PTL Lv   4 Term 24 40w1d 02:59 / 00:07 3.5 kg (7 lb 11.5 oz) M Vag-Spont EPI N FLORENTINO   3 Term 21 39w0d 06:17 / 00:20 3.477 kg (7 lb 10.7 oz) M Vag-Vacuum EPI N FLORENTINO   2 Term 17 37w1d / 00:06 2.296 kg (5 lb 1 oz) F Vag-Spont EPI N FLORENTINO   1 Term 16 38w4d / 01:20 3.12 kg (6 lb 14.1 oz) F Vag-Spont EPI N FLORENTINO       PRE-PROCEDURE DIAGNOSIS: Maternal care for fetal decelerations during pregnancy    PROCEDURE:   * No surgery found *       MATERNAL LABS   Lab Results   Component Value Date    GROUPTRH O POS 2024    HGB 10.6 (L) 2024    HCT 31.7 (L) 2024     2024    RUBELLAIMMUN Immune 2024    HEPBSAG Negative 2024    NGP19VAKO Non Reactive 2024    RPR Non Reactive 2024    OBGLUCOSESCR 104 2017    STREPBCULT Positive 2024       Fentanyl, Urine   Date Value Ref Range Status   2024 Negative @DONALDO Negative Final     Comment:     The cut-off value is 5.0 ng/mL. This is a screening test. If results   do not   correlate with clinical presentation then a confirmatory send out   test is   advised. This result is intended for use in clinical management. It   is not   intended for use in employment related testing.          Benzodiazepines   Date Value Ref Range Status   08/18/2024 Negative Negative Final     Cocaine (Metab.)   Date Value Ref Range Status   08/18/2024 Negative Negative Final     Opiate Scrn, Ur   Date Value Ref Range Status   08/18/2024 Negative Negative Final     Barbiturate Screen, Ur   Date Value Ref Range Status   08/18/2024 Negative Negative Final     Amphetamine Screen, Ur   Date Value Ref Range Status   08/18/2024 Negative Negative Final     THC   Date Value Ref Range Status   08/18/2024 Negative Negative Final     Phencyclidine   Date Value Ref Range Status   08/18/2024 Negative Negative Final     BUPRENORPHINE   Date Value Ref Range Status   08/18/2024 Negative  Final     Comment:     Buprenorphine urine screening threshold: 5 ng/mL.    This report is intended for use in clinical monitoring and management   of   patients only.          SPECIMENS  Specimen (24h ago, onward)      None             Antibiotics (From admission, onward)      None            INPATIENT MEDICATIONS  Current Facility-Administered Medications   Medication Dose Route Frequency Provider Last Rate Last Admin    acetaminophen tablet 650 mg  650 mg Oral 4 times per day Margarita Barry MD        benzocaine-lanolin (DERMOPLAST) topical spray   Topical (Top) Continuous PRN Margarita Barry MD        butorphanol injection 1 mg  1 mg Intravenous Q3H PRN Babs Albarran MD        carboprost injection 250 mcg  250 mcg Intramuscular Q15 Min PRN Margarita Barry MD        diphenhydrAMINE capsule 25 mg  25 mg Oral Q4H PRN Margarita Barry MD        diphenoxylate-atropine 2.5-0.025 mg per tablet 2 tablet  2 tablet Oral Q6H PRN Margarita Barry MD        docusate sodium capsule 200 mg  200 mg Oral BID PRN Margarita Barry MD        fentanyl 2 mcg/mL with BUPivacaine 0.1% in sodium chloride 0.9% Epidural   Epidural PRN Margarita Barry MD        hydrocortisone 2.5 % rectal cream   Rectal TID PRN Margarita Barry MD        ibuprofen tablet 600 mg  600 mg  Oral Q6H PRN Margarita Barry MD        lanolin cream   Topical (Top) PRN Margarita Barry MD        measles, mumps and rubella vaccine 1,000-12,500 TCID50/0.5 mL injection 0.5 mL  0.5 mL Subcutaneous vaccine x 1 dose Margarita Barry MD        methylergonovine injection 200 mcg  200 mcg Intramuscular Once PRN Margarita Barry MD        miSOPROStoL tablet 800 mcg  800 mcg Rectal Once PRN Margarita Barry MD        ondansetron disintegrating tablet 8 mg  8 mg Oral Q8H PRN Margarita Barry MD        oxyCODONE-acetaminophen  mg per tablet 1 tablet  1 tablet Oral Q4H PRN Margarita Barry MD        oxyCODONE-acetaminophen 5-325 mg per tablet 1 tablet  1 tablet Oral Q4H PRN Margarita Barry MD        oxytocin 30 units in 500 mL normal saline infusion (loading dose)  10.0002 Units Intravenous Once PRN Margarita Barry MD        oxytocin 30 units/500 mL (60 milliunits/mL) in 0.9% NaCl (TITRATING)  95 geoffrey-units/min Intravenous Once Margarita Barry MD        oxytocin 30 units/500 mL (60 milliunits/mL) in 0.9% NaCl (TITRATING)  95 geoffrey-units/min Intravenous Once PRN Margarita Barry MD        oxytocin injection 10 Units  10 Units Intramuscular Once PRN Margarita Barry MD        prenatal vitamin oral tablet  1 tablet Oral Daily Margarita Barry MD        promethazine (PHENERGAN) 12.5 mg in 0.9% NaCl 50 mL IVPB  12.5 mg Intravenous Q3H PRN Babs Albarran MD        ROPIvacaine (PF) 2 mg/ml (0.2%) solution 20 mL  20 mL Epidural Once PRN Margarita Barry MD        senna-docusate 8.6-50 mg per tablet 1 tablet  1 tablet Oral QHS Margarita Barry MD        sodium chloride 0.9% flush 10 mL  10 mL Intravenous PRN Margarita Barry MD        Tdap vaccine injection 0.5 mL  0.5 mL Intramuscular vaccine x 1 dose Margarita Barry MD        terbutaline injection 0.25 mg  0.25 mg Subcutaneous PRN Margarita Barry MD   0.25 mg at 08/19/24 0952    tranexamic acid in NaCl,iso-os IVPB 1,000 mg  1,000 mg Intravenous Q30 Min PRN Margarita Barry MD         "zolpidem tablet 5 mg  5 mg Oral Nightly PRN Margarita Barry MD   5 mg at 24 0049       OUTPATIENT MEDICATIONS  No current outpatient medications    VITAL SIGNS (Most Recent)  Temp: 98.3 °F (36.8 °C) (24 1134)  Pulse: (!) 122 (24 1509)  Resp: 17 (24 1457)  BP: (!) 120/57 (24 1457)  SpO2: 96 % (24 1505)  Temp (36hrs), Av.4 °F (36.9 °C), Min:98.2 °F (36.8 °C), Max:98.8 °F (37.1 °C)      Delivery Information for Milind Arellano    Birth information:  YOB: 2023   Time of birth: 12:28 PM   Sex: male   Head Delivery Date/Time: 2024 12:28 PM   Delivery type: Vaginal, Spontaneous   Gestational Age: 40w1d        Delivery Providers    Delivering clinician: Margarita Barry MD   Provider Role    Conchita Cody RN Delivery Nurse    Mary Jane Real RN Registered Nurse    Pham Monzon CNM Midwife    Jung Pelletier RN Registered Nurse    Shena Monzon RN Registered Nurse              Measurements    Weight: 3500 g  Weight (lbs): 7 lb 11.5 oz  Length: 48.9 cm  Length (in): 19.25"         Apgars    Living status: Living  Apgar Component Scores:  1 min.:  5 min.:  10 min.:  15 min.:  20 min.:    Skin color:  0  1       Heart rate:  2  2       Reflex irritability:  2  2       Muscle tone:  2  2       Respiratory effort:  1  2       Total:  7  9       Apgars assigned by: JUNG PELLETIER         Operative Delivery    Forceps attempted?: No  Vacuum extractor attempted?: No         Shoulder Dystocia    Shoulder dystocia present?: No           Presentation    Presentation: Vertex  Position: Left Occiput Anterior           Interventions/Resuscitation    Method: Bulb Suctioning, Tactile Stimulation, Deep Suctioning, Other       Cord    Vessels: 3 vessels  Complications: Nuchal, Body  Nuchal Intervention: reduced  Nuchal Cord Description: tight nuchal cord  Cord Around: left lower extremity  Number of Loops: 2  Delayed Cord Clamping?: Yes  Cord Clamped " Date/Time: 2024 12:29 PM  Cord Blood Disposition: Sent with Baby  Gases Sent?: No  Cord Comments: delivered through the cord  Stem Cell Collection (by MD): No       Placenta    Placenta delivery date/time: 2024 1233  Placenta removal: Spontaneous  Placenta appearance: Intact  Placenta disposition: Discarded           Labor Events:       labor: No     Labor Onset Date/Time: 2024 09:22     Dilation Complete Date/Time: 2024 12:21     Start Pushing Date/Time: 2024 12:23     Rupture Date/Time: 24         Rupture type: ARM (Artificial Rupture)         Fluid Amount:       Fluid Color: Clear       steroids: None     Antibiotics given for GBS: Yes     Induction: misoprostol     Indications for induction:        Augmentation:       Indications for augmentation:       Labor complications: None     Additional complications:          Cervical ripenin2024 12:50 AM      Misoprostol          Delivery:      Episiotomy: None     Indication for Episiotomy:       Perineal Lacerations: 1st Repaired:  Yes   Periurethral Laceration:   Repaired:     Labial Laceration:   Repaired:     Sulcus Laceration:   Repaired:     Vaginal Laceration:   Repaired:     Cervical Laceration:   Repaired:     Repair suture: None     Repair # of packets: 1     Last Value - EBL - Nursing (mL):       Sum - EBL - Nursing (mL): 0     Last Value - EBL - Anesthesia (mL):      Calculated QBL (mL): 225      Running total QBL (mL): 287      Vaginal Sweep Performed: Yes     Surgicount Correct: Yes       Other providers:       Anesthesia    Method: Epidural          Details (if applicable):  Trial of Labor      Categorization:      Priority:     Indications for :     Incision Type:       Additional  information:  Forceps:    Vacuum:    Breech:    Observed anomalies    Other (Comments): Nursery & NNP also completed CPT on baby post delivery         Time ruptured: 3h  06m    SKIN TO SKIN                INFANT FEEDING       PAST MEDICAL HISTORY   Past Medical History:   Diagnosis Date    Abnormal Pap smear of cervix 2016    ASCUS + HPV     Fever blister     Ovarian cyst         PAST SURGICAL HISTORY    Past Surgical History:   Procedure Laterality Date    EYE SURGERY          SOCIAL HISTORY    Social History     Tobacco Use    Smoking status: Former     Current packs/day: 0.50     Types: Cigarettes    Smokeless tobacco: Never    Tobacco comments:     stopped with +UPT    Substance Use Topics    Alcohol use: Not Currently     Comment: occassionally    Drug use: Not Currently     Types: Marijuana     Comment: pre pregnancy                     Conchita Cody RN  Date of Service: 08/19/2024  4:07 PM

## 2024-08-19 NOTE — ANESTHESIA PROCEDURE NOTES
Epidural    Patient location during procedure: OB   Reason for block: primary anesthetic   Reason for block: labor analgesia requested by patient and obstetrician  Diagnosis: Intrauterine Pregnancy   Start time: 8/19/2024 9:02 AM  Timeout: 8/19/2024 9:02 AM  End time: 8/19/2024 9:02 AM    Staffing  Performing Provider: Macho Castaneda MD  Authorizing Provider: Macho Castaneda MD    Staffing  Performed by: Macho Castaneda MD  Authorized by: Macho Castaneda MD        Preanesthetic Checklist  Completed: patient identified, IV checked, risks and benefits discussed, monitors and equipment checked, pre-op evaluation, timeout performed, anesthesia consent given, hand hygiene performed and patient being monitored  Preparation  Patient position: sitting  Prep: ChloraPrep  Patient monitoring: ECG and Blood Pressure  Reason for block: primary anesthetic   Epidural  Skin Anesthetic: lidocaine 1%  Skin Wheal: 3 mL  Administration type: single shot  Approach: midline  Interspace: L3-4    Injection technique: JADON air  Needle and Epidural Catheter  Needle type: Tuohy   Needle gauge: 17  Needle length: 3.5 inches  Needle insertion depth: 5 cm  Catheter type: springwound and multi-orifice  Catheter size: 18 G  Catheter at skin depth: 11 cm  Insertion Attempts: 2  Test dose: 3 mL of lidocaine 1.5% with Epi 1-to-200,000  Additional Documentation: negative aspiration for heme and CSF, incremental injection, no signs/symptoms of IV or SA injection, no significant complaints from patient, no paresthesia on injection and no significant pain on injection  Needle localization: anatomical landmarks  Medications:  Volume per aspiration: 5 mL   Assessment  Ease of block: easy  Patient's tolerance of the procedure: comfortable throughout block and no complaints  Additional Notes  No relief after first epidural placement.  Repeated. 10 ml of 0.2% Ropivacaine given through epidural catheter in 5 ml increments with 1st.  10 ml 2% lidocaine  as bolus for the 2nd.     No inadvertent dural puncture with Tuohy.  Dural puncture not performed with spinal needle

## 2024-08-19 NOTE — ANESTHESIA PREPROCEDURE EVALUATION
08/19/2024  Sara Arellano is a 33 y.o., female.      Patient Active Problem List   Diagnosis    Ovarian cyst    Cervical high risk HPV (human papillomavirus) test positive    Alteration in comfort associated with uterine contractions    Encounter for elective induction of labor       Past Surgical History:   Procedure Laterality Date    EYE SURGERY          Tobacco Use:  The patient  reports that she has quit smoking. Her smoking use included cigarettes. She has never used smokeless tobacco.     Results for orders placed or performed during the hospital encounter of 08/23/19   EKG 12-lead    Collection Time: 08/23/19  6:41 PM    Narrative    Test Reason : R07.9,    Vent. Rate : 070 BPM     Atrial Rate : 070 BPM     P-R Int : 150 ms          QRS Dur : 076 ms      QT Int : 364 ms       P-R-T Axes : 061 072 062 degrees     QTc Int : 393 ms    Normal sinus rhythm  Normal ECG  When compared with ECG of 04-MAY-2019 18:24,  No significant change was found  Confirmed by LOPEZ CONRAD MD ([id number]) on 8/24/2019 7:40:04 AM    Referred By: [email or web address]   SELF           Confirmed By:LOPEZ CONRAD MD             Lab Results   Component Value Date    WBC 9.59 08/18/2024    HGB 10.6 (L) 08/18/2024    HCT 31.7 (L) 08/18/2024    MCV 87 08/18/2024     08/18/2024     BMP  Lab Results   Component Value Date     09/16/2021    K 4.0 09/16/2021     09/16/2021    CO2 20 (L) 09/16/2021    BUN 7 09/16/2021    CREATININE 0.5 09/16/2021    CALCIUM 10.0 09/16/2021    ANIONGAP 12 09/16/2021    GLU 87 09/16/2021    GLU 94 08/23/2019    GLU 96 05/04/2019       No results found for this or any previous visit.          Pre-op Assessment    I have reviewed the Patient Summary Reports.     I have reviewed the Nursing Notes. I have reviewed the NPO Status.   I have reviewed the Medications.     Review of Systems  Anesthesia  Hx:  No problems with previous Anesthesia             Denies Family Hx of Anesthesia complications.    Denies Personal Hx of Anesthesia complications.                    Social:  Former Smoker       Hematology/Oncology:  Hematology Normal   Oncology Normal                                   EENT/Dental:  EENT/Dental Normal           Cardiovascular:  Cardiovascular Normal                  ECG has been reviewed.                          Pulmonary:  Pulmonary Normal                       Renal/:  Renal/ Normal                 Hepatic/GI:  Hepatic/GI Normal                 Musculoskeletal:  Musculoskeletal Normal                Neurological:  Neurology Normal                                      Endocrine:  Endocrine Normal            Psych:  Psychiatric Normal                    Physical Exam  General: Well nourished, Cooperative, Alert and Oriented    Airway:  Mallampati: II   Mouth Opening: Normal  TM Distance: Normal  Tongue: Normal  Neck ROM: Normal ROM    Dental:  Intact    Chest/Lungs:  Clear to auscultation    Heart:  Rate: Normal  Rhythm: Regular Rhythm  Sounds: Normal        Anesthesia Plan  Type of Anesthesia, risks & benefits discussed:    Anesthesia Type: Epidural  Intra-op Monitoring Plan: Standard ASA Monitors  Induction:  IV  Informed Consent: Informed consent signed with the Patient and all parties understand the risks and agree with anesthesia plan.  All questions answered.   ASA Score: 2    Ready For Surgery From Anesthesia Perspective.     .

## 2024-08-19 NOTE — NURSING
[facility]  Department of Obstetrics and Gynecology  Labor & Delivery Triage Assessment    PATIENT NAME: Sara Rubio  MRN: [id number]  TODAY'S DATE: 2024    CHIEF COMPLAINT: Contractions      OB History    Para Term  AB Living   4 3 3 0 0 3   SAB IAB Ectopic Multiple Live Births   0 0 0 0 3      # Outcome Date GA Lbr Roman/2nd Weight Sex Type Anes PTL Lv   4 Current            3 Term 21 39w0d 06:17 / 00:20 3.477 kg (7 lb 10.7 oz) M Vag-Vacuum EPI N FLORENTINO      Name: ADAM RUBIO      Apgar1: 8  Apgar5: 9   2 Term 17 37w1d / 00:06 2.296 kg (5 lb 1 oz) F Vag-Spont EPI N FLORENTINO      Name: IRISH RUBIO      Apgar1: 8  Apgar5: 9   1 Term 16 38w4d / 01:20 3.12 kg (6 lb 14.1 oz) F Vag-Spont EPI N FLORENTINO      Name: IRISH RUBIO      Apgar1: 8  Apgar5: 9     Past Medical History:   Diagnosis Date    Abnormal Pap smear of cervix 2016    ASCUS + HPV     Fever blister     Ovarian cyst      Past Surgical History:   Procedure Laterality Date    EYE SURGERY           VITAL SIGNS - ABNORMAL VITALS INCLUDE TEMP >100.4,RR <12 or >26, SUSTAINED MATERNAL PULSE <60 or >120     VITAL SIGNS (Most Recent)  Temp: 98.4 °F (36.9 °C) (24)  Pulse: 99 (24)  Resp: 16 (24)  BP: 134/74 (24)    VITAL SIGNS     normal  HEADACHE    no     VOMITING    no  VISUAL DISTURBANCES  no  EPIGASTRIC PAIN        no  PROTEINURIA 2+ or MORE             na   EDEMA FACE/EXTREMITIES            no    FETAL MOVEMENT     FETAL MOVEMENT: Active  FETAL HEART RATE BASELINE =  150  normal  FETAL HEART RATE VARIABILITY:  Moderate  FETAL HEART RATE ACCELERATIONS FOR GESTATIONAL AGE: present  FETAL HEART RATE DECELERATIONS: variable    ABDOMINAL PAIN/CRAMPING/CONTRACTIONS     Contraction pattern irregular. Pt unaware of contractions at times.     RUPTURE OF MEMBRANES OR LEAKING OF AMNIOTIC FLUID     Patient denies ROM or leaking of amniotic fluid.    VAGINAL BLEEDING      Patient denies vaginal bleeding.    VAGINAL EXAM     DILATION:  1  STATION:  -3  EFFACEMENT:  50  PRESENTATION:      VAGINAL EXAM DEFERRED DUE TO:   Yee BOYCE     PAIN PRESENT ON ARRIVAL     ONSET:   today  LOCATION:  Thigh   PAIN SCALE (0-10):  3  DESCRIPTION: pressure    Interventions     Oral fluids given and tolerated.      PATIENT DISPOSITION     Admitted to Labor & Delivery      Dr. Albarran notified at 1941 of the above assessment.    Hyacinth Wade, CARLI  [facility]  08/18/2024

## 2024-08-19 NOTE — PROGRESS NOTES
08/19/24 1219   TeleStork Ophelia Note - Strip   Strip Reviewed by [place] Nurse? Yes   TeleStork [facility]ia Note - Communication   [place] Nurse Communicated with Bedside Nurse Regarding: Fetal Status   TeleStork [facility]ia Note - Notification   Nurse Notified? Yes        Switch to zoloft from lexapro. F/u in 4 weeks with doctor.

## 2024-08-19 NOTE — SUBJECTIVE & OBJECTIVE
Obstetric HPI:  33-year-old  4 para 3 presents at 40 weeks 1 day gestation complaining of decreased fetal movement.  Patient was scheduled for induction later this week and had some variable type decelerations so we went ahead and admitted her for her induction of labor.    Objective:     Vital Signs (Most Recent):  Temp: 98.4 °F (36.9 °C) (24 0707)  Pulse: 98 (24 0802)  Resp: 17 (24 0758)  BP: (!) 113/59 (24 0758)  SpO2: 98 % (24 0802) Vital Signs (24h Range):  Temp:  [98.2 °F (36.8 °C)-98.8 °F (37.1 °C)] 98.4 °F (36.9 °C)  Pulse:  [] 98  Resp:  [15-18] 17  SpO2:  [96 %-100 %] 98 %  BP: (113-137)/(55-74) 113/59     Weight: 93.4 kg (206 lb)  Body mass index is 35.36 kg/m².    Patient is status post 1st dose of Cytotec and then 2nd dose of Cytotec was given by the RN at about 530 in the morning.  Patient is having some tachy systole and hyperstimulation.  She has received 1 dose of subcu terbutaline and we are giving another dose of that right now 0.25.  Fetal heart tones are overall reassuring which 140s with accelerations occasional variable type decelerations.  Occasional late decelerations.      TOCO:  Q 1-1.5 minutes    No intake or output data in the 24 hours ending 24 0952    Cervical Exam:  Dilation:  4  Effacement:  60%  Station: -2  Presentation: Vertex   Estimated fetal weight 8 lb  Significant Labs:  Recent Lab Results         24  1815        Benzodiazepines   Negative       Phencyclidine   Negative       BUPRENORPHINE   Negative  Comment: Buprenorphine urine screening threshold: 5 ng/mL.    This report is intended for use in clinical monitoring and management   of   patients only.          Amphetamines, Urine   Negative       Appearance, UA   Clear       Barbituates, Urine   Negative       Baso # 0.02         Basophil % 0.2         Bilirubin (UA)   Negative       Cocaine, Urine   Negative       Color, UA   Yellow       Urine  Creatinine   120.8  Comment: The random urine reference ranges provided were established   for 24 hour urine collections.  No reference ranges exist for  random urine specimens.  Correlate clinically.            120.8  Comment: The random urine reference ranges provided were established   for 24 hour urine collections.  No reference ranges exist for  random urine specimens.  Correlate clinically.            120.8  Comment: The random urine reference ranges provided were established   for 24 hour urine collections.  No reference ranges exist for  random urine specimens.  Correlate clinically.         Differential Method Automated         Eos # 0.2         Eos % 2.2         Fentanyl, Urine   Negative @DONALDO  Comment: The cut-off value is 5.0 ng/mL. This is a screening test. If results   do not   correlate with clinical presentation then a confirmatory send out   test is   advised. This result is intended for use in clinical management. It   is not   intended for use in employment related testing.         Glucose, UA   Negative       Gran # (ANC) 6.0         Gran % 62.4         Group & Rh O POS         Hematocrit 31.7         Hemoglobin 10.6         Immature Grans (Abs) 0.10  Comment: Mild elevation in immature granulocytes is non specific and   can be seen in a variety of conditions including stress response,   acute inflammation, trauma and pregnancy. Correlation with other   laboratory and clinical findings is essential.           Immature Granulocytes 1.0         INDIRECT KALIN NEG         Ketones, UA   Negative       Leukocyte Esterase, UA   Negative       Lymph # 2.3         Lymph % 24.4         MCH 29.2         MCHC 33.4         MCV 87         Mono # 0.9         Mono % 9.8         MPV 10.1         NITRITE UA   Negative       nRBC 0         Blood, UA   Negative       Opiates, Urine   Negative       pH, UA   7.0       Platelet Count 261         Protein, UA   Trace  Comment: Recommend a 24 hour urine protein or a  urine   protein/creatinine ratio if globulin induced proteinuria is  clinically suspected.         RBC 3.63         RDW 15.0         Spec Grav UA   1.025       Specimen UA   Urine, Clean Catch       Marijuana (THC) Metabolite   Negative       Toxicology Information   SEE COMMENT  Comment: This screen includes the following classes of drugs at the   listed cut-off:    Benzodiazepines                  200 ng/ml  Cocaine metabolite               300 ng/ml  Opiates                          300 ng/ml  Barbiturates                     200 ng/ml  Amphetamines                    1000 ng/ml  Marijuana metabs (THC)            50 ng/ml  Phencyclidine (PCP)               25 ng/ml    High concentrations of Methylenedioxymethamphetamine (MDMA aka  Ectasy) and other structurally similar compounds may cross-   react with the Amphetamine/Methamphetamine screening   immunoassay giving a false positive result.    Note: This exception list includes only more common   interferants in toxicology screen testing.  Because of many   cross-reactantspositive results on toxicology drug screens   should be confirmed whenever results do not correlate with   clinical presentation.    This report is intended for use in clinical monitoring and  management of patients. It is not intended for use in   employment related drug testing.    Because of any cross-reactants, positive results on toxicology  drug screens should be confirmed whenever results do not  correlate with clinical presentation.    Presumptive positive results are unconfirmed and may be used   only for medical purposes.         UROBILINOGEN UA   Negative       WBC 9.59                 Physical Exam  Patient is still hurting even after placement of the epidural   Epidural will be dosed  Pelvis is adequate for vaginal birth   Extremities no Homans or edema    Review of Systems

## 2024-08-19 NOTE — NURSING TRANSFER
Nursing Transfer Note      8/19/2024   4:08 PM    Nurse giving handoff:CARLI Arango  Nurse receiving handoff:CARLI Powell    Reason patient is being transferred: postpartum    Transfer To: 2103    Transfer via wheelchair    Transfer with personal belongings    Transported by CARLI Arango    Transfer Vital Signs:  Blood Pressure:120/57  Heart Rate:123  O2:96  Temperature:98.3  Respirations:17    Telemetry:   Order for Tele Monitor? No    Additional Lines:     4eyes on Skin: yes    Medicines sent: none    Any special needs or follow-up needed: none    Patient belongings transferred with patient: No    Chart send with patient: Yes    Notified: spouse    Patient reassessed at: 1602 8/19/2024 (date, time)  1  Upon arrival to floor: patient oriented to room, call bell in reach, and bed in lowest position

## 2024-08-19 NOTE — L&D DELIVERY NOTE
[facility]  Vaginal Delivery   Operative Note    SUMMARY   Had the certified nurse midwife come over and try to place an intrauterine pressure catheter for severe variables..  She found the patient to be 7 cm and moving very quickly.  Severe variable decelerations continued.  I came straight over.  Patient at that point was complete 0 station  She pushed to deliver a viable male infant occiput anterior.  There was a nuchal cord as well as a cord tightly wrapped around the leg.  Infant was a bit stunned upon delivery secondary to quick descent.  Laid on maternal abdomen.  Bulb suctioned.  Cord doubly clamped and cut after approximately 1 minute and special care nursery team was in attendance.  Placenta delivered spontaneously.  Small first-degree laceration reapproximated with 2-0 chromic in usual fashion.  Vaginal sweep all clear.  Uterus nice and firm.  Caruso catheter had come out right at delivery so we will see if the patient can get away without a Caruso catheter for now.  She is moving around pretty well.   mL Patient tolerated delivery well. Sponge needle and lap counted correctly x2.    Apgars still pending.    Indications:  Fetal variable decelerations  Pregnancy complicated by:   Patient Active Problem List   Diagnosis    Ovarian cyst    Cervical high risk HPV (human papillomavirus) test positive    Alteration in comfort associated with uterine contractions    Encounter for elective induction of labor     Admitting GA: 40w1d    Delivery Information for Milind Arellano    Birth information:  YOB: 2023   Time of birth: 12:28 PM   Sex: male   Head Delivery Date/Time:     Delivery type:    Gestational Age: 40w1d        Delivery Providers    Delivering clinician:            Measurements    Weight:   Length:          Apgars    Living status:   Apgar Component Scores:  1 min.:  5 min.:  10 min.:  15 min.:  20 min.:    Skin color:         Heart rate:         Reflex  irritability:         Muscle tone:         Respiratory effort:         Total:                                  Interventions/Resuscitation           Cord    No data filed       Placenta    Placenta delivery date/time:   Placenta removal:            Labor Events:       labor:       Labor Onset Date/Time:         Dilation Complete Date/Time: 2024 12:21     Start Pushing Date/Time: 2024 12:23       Start Pushing Date/Time: 2024 12:23     Rupture Date/Time: 24  09         Rupture type: ARM (Artificial Rupture)         Fluid Amount:       Fluid Color: Clear               steroids:       Antibiotics given for GBS:       Induction: misoprostol     Indications for induction:        Augmentation:       Indications for augmentation:       Labor complications:       Additional complications:          Cervical ripenin2024 12:50 AM      Misoprostol          Delivery:      Episiotomy:       Indication for Episiotomy:       Perineal Lacerations:   Repaired:      Periurethral Laceration:   Repaired:     Labial Laceration:   Repaired:     Sulcus Laceration:   Repaired:     Vaginal Laceration:   Repaired:     Cervical Laceration:   Repaired:     Repair suture:       Repair # of packets:       Last Value - EBL - Nursing (mL):       Sum - EBL - Nursing (mL): 0     Last Value - EBL - Anesthesia (mL):      Calculated QBL (mL):       Running total QBL (mL):       Vaginal Sweep Performed:       Surgicount Correct:         Other providers:            Details (if applicable):  Trial of Labor      Categorization:      Priority:     Indications for :     Incision Type:       Additional  information:  Forceps:    Vacuum:    Breech:    Observed anomalies    Other (Comments):

## 2024-08-19 NOTE — PROGRESS NOTES
[facility]  Obstetrics  Antepartum Progress Note    Patient Name: Sara Arellano  MRN: [id number]  Admission Date: 2024  Hospital Length of Stay: 1 days  Attending Physician: Margarita Barry MD  Primary Care Provider: No, Primary Doctor    Subjective:     Principal Problem:<principal problem not specified>    HPI:  No notes on file    Hospital Course:  No notes on file    Obstetric HPI:  33-year-old  4 para 3 presents at 40 weeks 1 day gestation complaining of decreased fetal movement.  Patient was scheduled for induction later this week and had some variable type decelerations so we went ahead and admitted her for her induction of labor.    Objective:     Vital Signs (Most Recent):  Temp: 98.4 °F (36.9 °C) (24 0707)  Pulse: 98 (24 0802)  Resp: 17 (24 0758)  BP: (!) 113/59 (24 0758)  SpO2: 98 % (24 0802) Vital Signs (24h Range):  Temp:  [98.2 °F (36.8 °C)-98.8 °F (37.1 °C)] 98.4 °F (36.9 °C)  Pulse:  [] 98  Resp:  [15-18] 17  SpO2:  [96 %-100 %] 98 %  BP: (113-137)/(55-74) 113/59     Weight: 93.4 kg (206 lb)  Body mass index is 35.36 kg/m².    Patient is status post 1st dose of Cytotec and then 2nd dose of Cytotec was given by the RN at about 530 in the morning.  Patient is having some tachy systole and hyperstimulation.  She has received 1 dose of subcu terbutaline and we are giving another dose of that right now 0.25.  Fetal heart tones are overall reassuring which 140s with accelerations occasional variable type decelerations.  Occasional late decelerations.      TOCO:  Q 1-1.5 minutes    No intake or output data in the 24 hours ending 24 0952    Cervical Exam:  Dilation:  4  Effacement:  60%  Station: -2  Presentation: Vertex   Estimated fetal weight 8 lb  Significant Labs:  Recent Lab Results         24        Benzodiazepines   Negative       Phencyclidine   Negative       BUPRENORPHINE   Negative  Comment:  Buprenorphine urine screening threshold: 5 ng/mL.    This report is intended for use in clinical monitoring and management   of   patients only.          Amphetamines, Urine   Negative       Appearance, UA   Clear       Barbituates, Urine   Negative       Baso # 0.02         Basophil % 0.2         Bilirubin (UA)   Negative       Cocaine, Urine   Negative       Color, UA   Yellow       Urine Creatinine   120.8  Comment: The random urine reference ranges provided were established   for 24 hour urine collections.  No reference ranges exist for  random urine specimens.  Correlate clinically.            120.8  Comment: The random urine reference ranges provided were established   for 24 hour urine collections.  No reference ranges exist for  random urine specimens.  Correlate clinically.            120.8  Comment: The random urine reference ranges provided were established   for 24 hour urine collections.  No reference ranges exist for  random urine specimens.  Correlate clinically.         Differential Method Automated         Eos # 0.2         Eos % 2.2         Fentanyl, Urine   Negative @DONALDO  Comment: The cut-off value is 5.0 ng/mL. This is a screening test. If results   do not   correlate with clinical presentation then a confirmatory send out   test is   advised. This result is intended for use in clinical management. It   is not   intended for use in employment related testing.         Glucose, UA   Negative       Gran # (ANC) 6.0         Gran % 62.4         Group & Rh O POS         Hematocrit 31.7         Hemoglobin 10.6         Immature Grans (Abs) 0.10  Comment: Mild elevation in immature granulocytes is non specific and   can be seen in a variety of conditions including stress response,   acute inflammation, trauma and pregnancy. Correlation with other   laboratory and clinical findings is essential.           Immature Granulocytes 1.0         INDIRECT KALIN NEG         Ketones, UA   Negative       Leukocyte  Esterase, UA   Negative       Lymph # 2.3         Lymph % 24.4         MCH 29.2         MCHC 33.4         MCV 87         Mono # 0.9         Mono % 9.8         MPV 10.1         NITRITE UA   Negative       nRBC 0         Blood, UA   Negative       Opiates, Urine   Negative       pH, UA   7.0       Platelet Count 261         Protein, UA   Trace  Comment: Recommend a 24 hour urine protein or a urine   protein/creatinine ratio if globulin induced proteinuria is  clinically suspected.         RBC 3.63         RDW 15.0         Spec Grav UA   1.025       Specimen UA   Urine, Clean Catch       Marijuana (THC) Metabolite   Negative       Toxicology Information   SEE COMMENT  Comment: This screen includes the following classes of drugs at the   listed cut-off:    Benzodiazepines                  200 ng/ml  Cocaine metabolite               300 ng/ml  Opiates                          300 ng/ml  Barbiturates                     200 ng/ml  Amphetamines                    1000 ng/ml  Marijuana metabs (THC)            50 ng/ml  Phencyclidine (PCP)               25 ng/ml    High concentrations of Methylenedioxymethamphetamine (MDMA aka  Ectasy) and other structurally similar compounds may cross-   react with the Amphetamine/Methamphetamine screening   immunoassay giving a false positive result.    Note: This exception list includes only more common   interferants in toxicology screen testing.  Because of many   cross-reactantspositive results on toxicology drug screens   should be confirmed whenever results do not correlate with   clinical presentation.    This report is intended for use in clinical monitoring and  management of patients. It is not intended for use in   employment related drug testing.    Because of any cross-reactants, positive results on toxicology  drug screens should be confirmed whenever results do not  correlate with clinical presentation.    Presumptive positive results are unconfirmed and may be used   only  for medical purposes.         UROBILINOGEN UA   Negative       WBC 9.59                 Physical Exam  Patient is still hurting even after placement of the epidural   Epidural will be dosed  Pelvis is adequate for vaginal birth   Extremities no Homans or edema    Review of Systems  Assessment/Plan:     33 y.o. female  at 40w1d for:    Alteration in comfort associated with uterine contractions  Intrauterine pregnancy at 40 weeks 1 day gestation   Labor induction secondary to decreased fetal movement fetal variable decelerations and patient appeared to be maybe already in early labor   Group B Streptococcus positive she has been receiving Ancef as there is a questionable reported allergy to penicillin the eye was unaware of.  Patient received 1st dose of Cytotec and then did receive 2nd dose of Cytotec and appearing to have some hyperstimulation and tachy systole.  She has received 1 dose of terbutaline she has been fluid bolused and position changes have been performed.  We are going to go ahead and give another dose of terbutaline.  The fetal heart tones look reassuring with good variability.          Margarita Barry MD  Obstetrics  [facility]

## 2024-08-20 LAB
BASOPHILS # BLD AUTO: 0.04 K/UL (ref 0–0.2)
BASOPHILS NFR BLD: 0.3 % (ref 0–1.9)
DIFFERENTIAL METHOD BLD: ABNORMAL
EOSINOPHIL # BLD AUTO: 0.2 K/UL (ref 0–0.5)
EOSINOPHIL NFR BLD: 1.6 % (ref 0–8)
ERYTHROCYTE [DISTWIDTH] IN BLOOD BY AUTOMATED COUNT: 15.2 % (ref 11.5–14.5)
HCT VFR BLD AUTO: 29.6 % (ref 37–48.5)
HGB BLD-MCNC: 9.3 G/DL (ref 12–16)
IMM GRANULOCYTES # BLD AUTO: 0.11 K/UL (ref 0–0.04)
IMM GRANULOCYTES NFR BLD AUTO: 0.8 % (ref 0–0.5)
LYMPHOCYTES # BLD AUTO: 1.9 K/UL (ref 1–4.8)
LYMPHOCYTES NFR BLD: 14.2 % (ref 18–48)
MCH RBC QN AUTO: 27.8 PG (ref 27–31)
MCHC RBC AUTO-ENTMCNC: 31.4 G/DL (ref 32–36)
MCV RBC AUTO: 88 FL (ref 82–98)
MONOCYTES # BLD AUTO: 1.1 K/UL (ref 0.3–1)
MONOCYTES NFR BLD: 8.1 % (ref 4–15)
NEUTROPHILS # BLD AUTO: 10.2 K/UL (ref 1.8–7.7)
NEUTROPHILS NFR BLD: 75 % (ref 38–73)
NRBC BLD-RTO: 0 /100 WBC
PLATELET # BLD AUTO: 221 K/UL (ref 150–450)
PMV BLD AUTO: 10.3 FL (ref 9.2–12.9)
RBC # BLD AUTO: 3.35 M/UL (ref 4–5.4)
WBC # BLD AUTO: 13.59 K/UL (ref 3.9–12.7)

## 2024-08-20 PROCEDURE — 85025 COMPLETE CBC W/AUTO DIFF WBC: CPT | Performed by: SPECIALIST

## 2024-08-20 PROCEDURE — 36415 COLL VENOUS BLD VENIPUNCTURE: CPT | Performed by: SPECIALIST

## 2024-08-20 PROCEDURE — 25000003 PHARM REV CODE 250: Performed by: SPECIALIST

## 2024-08-20 PROCEDURE — 12000002 HC ACUTE/MED SURGE SEMI-PRIVATE ROOM

## 2024-08-20 RX ORDER — OXYCODONE AND ACETAMINOPHEN 5; 325 MG/1; MG/1
1 TABLET ORAL EVERY 6 HOURS PRN
Qty: 8 TABLET | Refills: 0 | Status: SHIPPED | OUTPATIENT
Start: 2024-08-20

## 2024-08-20 RX ADMIN — OXYCODONE HYDROCHLORIDE AND ACETAMINOPHEN 1 TABLET: 10; 325 TABLET ORAL at 05:08

## 2024-08-20 RX ADMIN — OXYCODONE HYDROCHLORIDE AND ACETAMINOPHEN 1 TABLET: 5; 325 TABLET ORAL at 11:08

## 2024-08-20 RX ADMIN — IBUPROFEN 600 MG: 200 TABLET, FILM COATED ORAL at 05:08

## 2024-08-20 RX ADMIN — SENNOSIDES AND DOCUSATE SODIUM 1 TABLET: 50; 8.6 TABLET ORAL at 09:08

## 2024-08-20 RX ADMIN — IBUPROFEN 600 MG: 200 TABLET, FILM COATED ORAL at 11:08

## 2024-08-20 RX ADMIN — IBUPROFEN 600 MG: 200 TABLET, FILM COATED ORAL at 03:08

## 2024-08-20 RX ADMIN — OXYCODONE HYDROCHLORIDE AND ACETAMINOPHEN 1 TABLET: 10; 325 TABLET ORAL at 09:08

## 2024-08-20 RX ADMIN — OXYCODONE HYDROCHLORIDE AND ACETAMINOPHEN 1 TABLET: 10; 325 TABLET ORAL at 12:08

## 2024-08-20 NOTE — PLAN OF CARE
Problem: Adult Inpatient Plan of Care  Goal: Plan of Care Review  Outcome: Progressing  Goal: Patient-Specific Goal (Individualized)  Outcome: Progressing  Goal: Absence of Hospital-Acquired Illness or Injury  Outcome: Progressing  Goal: Optimal Comfort and Wellbeing  Outcome: Progressing  Goal: Readiness for Transition of Care  Outcome: Progressing     Problem:  Fall Injury Risk  Goal: Absence of Fall, Infant Drop and Related Injury  Outcome: Progressing     Problem: Breastfeeding  Goal: Effective Breastfeeding  Outcome: Progressing     Problem: Labor  Goal: Hemostasis  Outcome: Progressing  Goal: Stable Fetal Wellbeing  Outcome: Progressing  Goal: Effective Progression to Delivery  Outcome: Progressing  Goal: Absence of Infection Signs and Symptoms  Outcome: Progressing  Goal: Acceptable Pain Control  Outcome: Progressing  Goal: Normal Uterine Contraction Pattern  Outcome: Progressing

## 2024-08-20 NOTE — PLAN OF CARE
08/20/24 0808   Final Note   Assessment Type Final Discharge Note   Anticipated Discharge Disposition Home   What phone number can be called within the next 1-3 days to see how you are doing after discharge? [phone]   Post-Acute Status   Discharge Delays None known at this time     Discharge orders and chart reviewed with no further post-acute discharge needs identified at this time.  At this time, patient is cleared for discharge from Case Management standpoint.

## 2024-08-20 NOTE — ANESTHESIA POSTPROCEDURE EVALUATION
Anesthesia Post Evaluation    Patient: Sara Arellano    Procedure(s) Performed: * No procedures listed *    Final Anesthesia Type: epidural      Patient location during evaluation: floor  Patient participation: Yes- Able to Participate  Level of consciousness: awake and alert  Post-procedure vital signs: reviewed and stable  Pain management: adequate  Airway patency: patent    PONV status at discharge: No PONV  Anesthetic complications: no      Cardiovascular status: stable  Respiratory status: unassisted  Hydration status: euvolemic  Follow-up not needed.  Comments: Both lower extremities back to normal from labor epidural.  Ambulating well. No headache or back pain, just soreness at epidural site. No anesthesia complications.                Vitals Value Taken Time   /82 08/20/24 0710   Temp 37 °C (98.6 °F) 08/20/24 0710   Pulse 92 08/20/24 0710   Resp 17 08/20/24 0710   SpO2 97 % 08/20/24 0710         No case tracking events are documented in the log.      Pain/Oleg Score: Pain Rating Prior to Med Admin: 4 (8/20/2024  3:35 AM)  Pain Rating Post Med Admin: 0 (8/20/2024  8:15 AM)

## 2024-08-20 NOTE — LACTATION NOTE
08/20/24 1215   Maternal Assessment   Breast Size Issue none   Breast Shape Bilateral:;round   Breast Density Bilateral:;soft   Areola Bilateral:;elastic   Nipples Bilateral:;everted   Maternal Infant Feeding   Infant Positioning cradle   Signs of Milk Transfer audible swallow;infant jaw motion present   Pain with Feeding no   Comfort Measures Before/During Feeding infant position adjusted;maternal position adjusted   Latch Assistance yes     Infant deeply latched to right breast in cradle position. Nutritive, rhythmic sucks and audible swallows. Mother denies any pain with feeding.   Mother Instructed on the signs of an effective feeding.  Discussed positioning, comfortable latch, rhythmic, nutritive sucking, audible swallows, appropriate length of feeding, comfort of latch and evaluating for fullness cues.  Also discussed appropriate output for age.  Pt states understanding and verbalized appropriate recall.  Encouraged to call for breastfeeding assistance prn; v/u.

## 2024-08-20 NOTE — DISCHARGE SUMMARY
[facility]  Obstetrics  Discharge Summary      Patient Name: Sara Arellano  MRN: [id number]  Admission Date: 8/18/2024  Hospital Length of Stay: 2 days  Discharge Date and Time:  08/20/2024 7:34 AM  Attending Physician: Margarita Barry MD   Discharging Provider: Margarita Baryr MD   Primary Care Provider: Sabra, Primary Doctor    HPI: No notes on file        * No surgery found *     Hospital Course:   No notes on file         Final Active Diagnoses:    Diagnosis Date Noted POA    PRINCIPAL PROBLEM:  Maternal care for fetal decelerations during pregnancy [O36.8390] 08/19/2024 Yes    Alteration in comfort associated with uterine contractions [N85.8] 09/11/2021 Yes      Problems Resolved During this Admission:        Significant Diagnostic Studies: Labs: CBC   Recent Labs   Lab 08/18/24 2127 08/20/24  0412   WBC 9.59 13.59*   HGB 10.6* 9.3*   HCT 31.7* 29.6*    221     Lab Results   Component Value Date    GROUPTRH O POS 08/18/2024     Physical exam:   Patient doing well requesting discharge home   Fundus firm nontender palpation     No Homans trace edema bilaterally     Assessment and plan:   Postpartum day 1. Spontaneous vaginal delivery   Group B strep positive  Rh positive   Will discharge home with small amount of Percocet for severe pain if needed otherwise she will take Motrin and Tylenol at home   Follow up with me in 6 weeks    Feeding Method: breast    Immunizations       Date Immunization Status Dose Route/Site Given by    08/19/24 1345 MMR Incomplete 0.5 mL Subcutaneous/     08/19/24 2358 Tdap Deferred 0.5 mL Intramuscular/ Elizabeth Esparza RN            Delivery:    Episiotomy: None   Lacerations: 1st   Repair suture: None   Repair # of packets: 1   Blood loss (ml):       Birth information:  YOB: 2023   Time of birth: 12:28 PM   Sex: male   Delivery type: Vaginal, Spontaneous   Gestational Age: 40w1d     Measurements    Weight: 3500 g  Weight (lbs): 7 lb 11.5 oz  Length:  "48.9 cm  Length (in): 19.25"         Delivery Clinician: Delivery Providers    Delivering clinician: Margarita Barry MD   Provider Role    Conchita Cody, RN Delivery Nurse    Mary Jane Real RN Registered Nurse    Pham Monzon CNM Midwife    Jung Pelletier RN Registered Nurse    Shena Monzon RN Registered Nurse             Additional  information:  Forceps:    Vacuum:    Breech:    Observed anomalies      Living?:     Apgars    Living status: Living  Apgar Component Scores:  1 min.:  5 min.:  10 min.:  15 min.:  20 min.:    Skin color:  0  1       Heart rate:  2  2       Reflex irritability:  2  2       Muscle tone:  2  2       Respiratory effort:  1  2       Total:  7  9       Apgars assigned by: JUNG PELLETIER         Placenta: Delivered:       appearance  Pending Diagnostic Studies:       None            Discharged Condition: good    Disposition: Home or Self Care    Follow Up:    Patient Instructions:      Diet Adult Regular     No driving until:   Order Comments: 14 days for vaginal delivery  28 days for  Section     No dressing needed     Leave dressing on - Keep it clean, dry, and intact until clinic visit     Activity as tolerated   Order Comments: Pelvic rest x 6 weeks     Medications:  Current Discharge Medication List        START taking these medications    Details   oxyCODONE-acetaminophen (PERCOCET) 5-325 mg per tablet Take 1 tablet by mouth every 6 (six) hours as needed for Pain.  Qty: 8 tablet, Refills: 0    Comments: Quantity prescribed more than 7 day supply? Yes, quantity medically necessary             Margarita Barry MD  Obstetrics  [facility]    "

## 2024-08-21 ENCOUNTER — PATIENT MESSAGE (OUTPATIENT)
Dept: LACTATION | Facility: HOSPITAL | Age: 34
End: 2024-08-21

## 2024-08-21 VITALS
BODY MASS INDEX: 35.17 KG/M2 | RESPIRATION RATE: 19 BRPM | SYSTOLIC BLOOD PRESSURE: 118 MMHG | HEART RATE: 103 BPM | TEMPERATURE: 99 F | DIASTOLIC BLOOD PRESSURE: 78 MMHG | OXYGEN SATURATION: 98 % | WEIGHT: 206 LBS | HEIGHT: 64 IN

## 2024-08-21 PROCEDURE — 25000003 PHARM REV CODE 250: Performed by: SPECIALIST

## 2024-08-21 RX ADMIN — OXYCODONE HYDROCHLORIDE AND ACETAMINOPHEN 1 TABLET: 10; 325 TABLET ORAL at 06:08

## 2024-08-21 RX ADMIN — DOCUSATE SODIUM 200 MG: 100 CAPSULE, LIQUID FILLED ORAL at 10:08

## 2024-08-21 RX ADMIN — IBUPROFEN 600 MG: 200 TABLET, FILM COATED ORAL at 10:08

## 2024-08-21 NOTE — LACTATION NOTE
This note was copied from a baby's chart.     08/21/24 1000   Maternal Assessment   Breast Shape round   Breast Density soft   Areola elastic   Nipples everted   Maternal Infant Feeding   Maternal Emotional State independent;relaxed   Infant Positioning cradle   Signs of Milk Transfer audible swallow   Pain with Feeding no   Latch Assistance no     Mom breastfeeding now. Good nutritive sucking & swallowing noted. Breastfeeding discharge instructions reviewed. Mom denies any questions/concerns @ this time. Assistance offered prn. Mom verbalized understanding

## 2024-08-21 NOTE — DISCHARGE INSTRUCTIONS

## 2024-10-07 ENCOUNTER — HOSPITAL ENCOUNTER (EMERGENCY)
Facility: HOSPITAL | Age: 34
Discharge: HOME | End: 2024-10-07
Attending: EMERGENCY MEDICINE
Payer: MEDICAID

## 2024-10-07 VITALS
WEIGHT: 200 LBS | DIASTOLIC BLOOD PRESSURE: 88 MMHG | BODY MASS INDEX: 34.33 KG/M2 | HEART RATE: 107 BPM | SYSTOLIC BLOOD PRESSURE: 122 MMHG | OXYGEN SATURATION: 97 % | TEMPERATURE: 100 F | RESPIRATION RATE: 18 BRPM

## 2024-10-07 DIAGNOSIS — R07.89 CHEST WALL PAIN: ICD-10-CM

## 2024-10-07 DIAGNOSIS — J18.9 PNEUMONIA OF LEFT LOWER LOBE DUE TO INFECTIOUS ORGANISM: Primary | ICD-10-CM

## 2024-10-07 DIAGNOSIS — R07.81 PLEURITIC CHEST PAIN: ICD-10-CM

## 2024-10-07 LAB
ALBUMIN SERPL BCP-MCNC: 4.5 G/DL (ref 3.5–5.2)
ALP SERPL-CCNC: 98 U/L (ref 55–135)
ALT SERPL W/O P-5'-P-CCNC: 30 U/L (ref 10–44)
ANION GAP SERPL CALC-SCNC: 9 MMOL/L (ref 8–16)
AST SERPL-CCNC: 19 U/L (ref 10–40)
B-HCG UR QL: NEGATIVE
BASOPHILS # BLD AUTO: 0.04 K/UL (ref 0–0.2)
BASOPHILS NFR BLD: 0.2 % (ref 0–1.9)
BILIRUB SERPL-MCNC: 0.4 MG/DL (ref 0.1–1)
BNP SERPL-MCNC: 35 PG/ML (ref 0–99)
BUN SERPL-MCNC: 8 MG/DL (ref 6–20)
CALCIUM SERPL-MCNC: 9.7 MG/DL (ref 8.7–10.5)
CHLORIDE SERPL-SCNC: 105 MMOL/L (ref 95–110)
CO2 SERPL-SCNC: 25 MMOL/L (ref 23–29)
CREAT SERPL-MCNC: 0.6 MG/DL (ref 0.5–1.4)
CTP QC/QA: YES
D DIMER PPP IA.FEU-MCNC: 0.46 MG/L FEU (ref 0–0.49)
DIFFERENTIAL METHOD BLD: ABNORMAL
EOSINOPHIL # BLD AUTO: 0.2 K/UL (ref 0–0.5)
EOSINOPHIL NFR BLD: 1.3 % (ref 0–8)
ERYTHROCYTE [DISTWIDTH] IN BLOOD BY AUTOMATED COUNT: 13.5 % (ref 11.5–14.5)
EST. GFR  (NO RACE VARIABLE): >60 ML/MIN/1.73 M^2
GLUCOSE SERPL-MCNC: 89 MG/DL (ref 70–110)
HCT VFR BLD AUTO: 36.3 % (ref 37–48.5)
HGB BLD-MCNC: 11.7 G/DL (ref 12–16)
IMM GRANULOCYTES # BLD AUTO: 0.09 K/UL (ref 0–0.04)
IMM GRANULOCYTES NFR BLD AUTO: 0.5 % (ref 0–0.5)
INFLUENZA A, MOLECULAR: NEGATIVE
INFLUENZA B, MOLECULAR: NEGATIVE
LYMPHOCYTES # BLD AUTO: 2.1 K/UL (ref 1–4.8)
LYMPHOCYTES NFR BLD: 12.8 % (ref 18–48)
MCH RBC QN AUTO: 28.1 PG (ref 27–31)
MCHC RBC AUTO-ENTMCNC: 32.2 G/DL (ref 32–36)
MCV RBC AUTO: 87 FL (ref 82–98)
MONOCYTES # BLD AUTO: 1.1 K/UL (ref 0.3–1)
MONOCYTES NFR BLD: 6.5 % (ref 4–15)
NEUTROPHILS # BLD AUTO: 13.2 K/UL (ref 1.8–7.7)
NEUTROPHILS NFR BLD: 78.7 % (ref 38–73)
NRBC BLD-RTO: 0 /100 WBC
PLATELET # BLD AUTO: 391 K/UL (ref 150–450)
PMV BLD AUTO: 9.6 FL (ref 9.2–12.9)
POTASSIUM SERPL-SCNC: 3.8 MMOL/L (ref 3.5–5.1)
PROT SERPL-MCNC: 8 G/DL (ref 6–8.4)
RBC # BLD AUTO: 4.17 M/UL (ref 4–5.4)
SARS-COV-2 RDRP RESP QL NAA+PROBE: NEGATIVE
SODIUM SERPL-SCNC: 139 MMOL/L (ref 136–145)
SPECIMEN SOURCE: NORMAL
TROPONIN I SERPL HS-MCNC: 7.1 PG/ML (ref 0–14.9)
WBC # BLD AUTO: 16.73 K/UL (ref 3.9–12.7)

## 2024-10-07 PROCEDURE — 93010 ELECTROCARDIOGRAM REPORT: CPT | Mod: ,,, | Performed by: INTERNAL MEDICINE

## 2024-10-07 PROCEDURE — 84484 ASSAY OF TROPONIN QUANT: CPT | Performed by: EMERGENCY MEDICINE

## 2024-10-07 PROCEDURE — 80053 COMPREHEN METABOLIC PANEL: CPT | Performed by: EMERGENCY MEDICINE

## 2024-10-07 PROCEDURE — 93005 ELECTROCARDIOGRAM TRACING: CPT | Performed by: INTERNAL MEDICINE

## 2024-10-07 PROCEDURE — 85025 COMPLETE CBC W/AUTO DIFF WBC: CPT | Performed by: EMERGENCY MEDICINE

## 2024-10-07 PROCEDURE — 83880 ASSAY OF NATRIURETIC PEPTIDE: CPT | Performed by: EMERGENCY MEDICINE

## 2024-10-07 PROCEDURE — U0002 COVID-19 LAB TEST NON-CDC: HCPCS | Performed by: EMERGENCY MEDICINE

## 2024-10-07 PROCEDURE — 85379 FIBRIN DEGRADATION QUANT: CPT | Performed by: EMERGENCY MEDICINE

## 2024-10-07 PROCEDURE — 81025 URINE PREGNANCY TEST: CPT | Performed by: EMERGENCY MEDICINE

## 2024-10-07 PROCEDURE — 87502 INFLUENZA DNA AMP PROBE: CPT | Performed by: EMERGENCY MEDICINE

## 2024-10-07 PROCEDURE — 99285 EMERGENCY DEPT VISIT HI MDM: CPT | Mod: 25

## 2024-10-07 RX ORDER — AZITHROMYCIN 250 MG/1
250 TABLET, FILM COATED ORAL DAILY
Qty: 6 TABLET | Refills: 0 | Status: SHIPPED | OUTPATIENT
Start: 2024-10-07

## 2024-10-07 NOTE — FIRST PROVIDER EVALUATION
" Emergency Department TeleTriage Encounter Note      CHIEF COMPLAINT    Chief Complaint   Patient presents with    Cough     Reports cough for approximately 3-4 days with sharp pain in chest when taking a deep breath     Generalized Body Aches    Chest Pain       VITAL SIGNS   Initial Vitals [10/07/24 1228]   BP Pulse Resp Temp SpO2   122/88 107 18 99.7 °F (37.6 °C) 97 %      MAP       --            ALLERGIES    Review of patient's allergies indicates:   Allergen Reactions    Penicillins Hives       PROVIDER TRIAGE NOTE  Patient presents with cough 3 days. Now complaining of chills and "clammy", body aches. Reports sharp chest pain with inspiration. No known exposure to illness other than family having GI bug recently. She is breastfeeding.       ORDERS  Labs Reviewed - No data to display    ED Orders (720h ago, onward)      None              Virtual Visit Note: The provider triage portion of this emergency department evaluation and documentation was performed via [employer], a HIPAA-compliant telemedicine application, in concert with a tele-presenter in the room. A face to face patient evaluation with one of my colleagues will occur once the patient is placed in an emergency department room.      DISCLAIMER: This note was prepared with [employer] voice recognition transcription software. Garbled syntax, mangled pronouns, and other bizarre constructions may be attributed to that software system.    "

## 2024-10-07 NOTE — ED PROVIDER NOTES
Encounter Date: 10/7/2024       History     Chief Complaint   Patient presents with    Cough     Reports cough for approximately 3-4 days with sharp pain in chest when taking a deep breath     Generalized Body Aches    Chest Pain     HPI patient is a 34-year-old woman proximally a month and a half postpartum who presents emergency department for cough for approximately 2 weeks with pleuritic left-sided chest pain that radiates across to her right posterolateral back for the past couple of days.  Pain is worse on the left than the right.  She has had subjective fever, generalized body aches, myalgias.  Review of patient's allergies indicates:   Allergen Reactions    Penicillins Hives     Past Medical History:   Diagnosis Date    Abnormal Pap smear of cervix 2016    ASCUS + HPV     Fever blister     Ovarian cyst      Past Surgical History:   Procedure Laterality Date    EYE SURGERY       Family History   Problem Relation Name Age of Onset    Ovarian cancer Maternal Grandmother      Colon cancer Neg Hx      Breast cancer Neg Hx      Melanoma Neg Hx      Cancer Neg Hx       Social History     Tobacco Use    Smoking status: Former     Current packs/day: 0.50     Types: Cigarettes    Smokeless tobacco: Never    Tobacco comments:     stopped with +UPT    Substance Use Topics    Alcohol use: Not Currently     Comment: occassionally    Drug use: Not Currently     Types: Marijuana     Comment: pre pregnancy     Review of Systems   Constitutional:  Positive for chills and fatigue.   Respiratory:  Positive for cough and chest tightness.    Cardiovascular:  Positive for chest pain.   Musculoskeletal:  Positive for myalgias.       Physical Exam     Initial Vitals [10/07/24 1228]   BP Pulse Resp Temp SpO2   122/88 107 18 99.7 °F (37.6 °C) 97 %      MAP       --         Physical Exam    Constitutional: Vital signs are normal. She appears well-developed and well-nourished.  Non-toxic appearance. No distress.   HENT:   Head:  Normocephalic and atraumatic.   Eyes: EOM are normal. Pupils are equal, round, and reactive to light.   Neck: Neck supple. No JVD present.   Normal range of motion.  Cardiovascular:  Normal rate, regular rhythm, normal heart sounds and intact distal pulses.     Exam reveals no gallop and no friction rub.       No murmur heard.  Pulmonary/Chest: Breath sounds normal. She has no wheezes. She has no rhonchi. She has no rales.   Abdominal: Abdomen is soft. Bowel sounds are normal. There is no abdominal tenderness. There is no rebound and no guarding.   Musculoskeletal:         General: Normal range of motion.      Cervical back: Normal range of motion and neck supple. No rigidity.     Neurological: She is alert and oriented to person, place, and time. She has normal strength and normal reflexes. No cranial nerve deficit or sensory deficit. She exhibits normal muscle tone. Coordination normal. GCS eye subscore is 4. GCS verbal subscore is 5. GCS motor subscore is 6.   Skin: Skin is warm and dry.   Psychiatric: She has a normal mood and affect. Her speech is normal and behavior is normal. She is not actively hallucinating.         ED Course   Procedures  Labs Reviewed   CBC W/ AUTO DIFFERENTIAL - Abnormal       Result Value    WBC 16.73 (*)     RBC 4.17      Hemoglobin 11.7 (*)     Hematocrit 36.3 (*)     MCV 87      MCH 28.1      MCHC 32.2      RDW 13.5      Platelets 391      MPV 9.6      Immature Granulocytes 0.5      Gran # (ANC) 13.2 (*)     Immature Grans (Abs) 0.09 (*)     Lymph # 2.1      Mono # 1.1 (*)     Eos # 0.2      Baso # 0.04      nRBC 0      Gran % 78.7 (*)     Lymph % 12.8 (*)     Mono % 6.5      Eosinophil % 1.3      Basophil % 0.2      Differential Method Automated     COMPREHENSIVE METABOLIC PANEL    Sodium 139      Potassium 3.8      Chloride 105      CO2 25      Glucose 89      BUN 8      Creatinine 0.6      Calcium 9.7      Total Protein 8.0      Albumin 4.5      Total Bilirubin 0.4      Alkaline  Phosphatase 98      AST 19      ALT 30      eGFR >60.0      Anion Gap 9     D DIMER, QUANTITATIVE    D-Dimer 0.46     TROPONIN I HIGH SENSITIVITY    Troponin I High Sensitivity 7.1     B-TYPE NATRIURETIC PEPTIDE    BNP 35     SARS-COV-2 RNA AMPLIFICATION, QUAL    SARS-CoV-2 RNA, Amplification, Qual Negative     INFLUENZA A AND B ANTIGEN    Influenza A, Molecular Negative      Influenza B, Molecular Negative      Flu A & B Source Nasal swab      Narrative:     Specimen Source->Nasopharyngeal Swab   POCT URINE PREGNANCY    POC Preg Test, Ur Negative       Acceptable Yes          ECG Results              EKG 12-lead (In process)        Collection Time Result Time QRS Duration OHS QTC Calculation    10/07/24 13:24:48 10/07/24 13:44:28 80 389                     In process by Interface, Lab In [facility] (10/07/24 13:44:31)                   Narrative:    Test Reason : R07.89,    Vent. Rate : 077 BPM     Atrial Rate : 077 BPM     P-R Int : 134 ms          QRS Dur : 080 ms      QT Int : 344 ms       P-R-T Axes : 055 054 050 degrees     QTc Int : 389 ms    Normal sinus rhythm  Normal ECG  When compared with ECG of 07-OCT-2024 12:21,  No significant change was found    Referred By: [email or web address]   SELF           Confirmed By:                                   Imaging Results              X-Ray Chest AP Portable (Final result)  Result time 10/07/24 14:45:01      Final result by Jack Curtis MD (10/07/24 14:45:01)                   Impression:      1. Small ill-defined left basilar infiltrate compatible with pneumonia.      Electronically signed by: Jack Curtis  Date:    10/07/2024  Time:    14:45               Narrative:    EXAMINATION:  XR CHEST AP PORTABLE    CLINICAL HISTORY:  Other chest pain    COMPARISON:  2019    FINDINGS:  AP view demonstrates normal heart size.  The mediastinum is unremarkable.  The right lung is clear.  Small ill-defined left basilar infiltrate is compatible with pneumonia.  No  significant effusion is identified.                                       Medications - No data to display  Medical Decision Making  34-year-old woman proximally a month and a half postpartum who presents emergency department for cough for approximately 2 weeks with pleuritic left-sided chest pain that radiates across to her right posterolateral back for the past couple of days.  Pain is worse on the left than the right.  She has had subjective fever, generalized body aches, myalgias.  Differential diagnosis includes pneumonia, pulmonary embolism, atypical ACS, pneumothorax.  Chest x-ray shows a small left basilar infiltrate concerning for pneumonia per my interpretation.  BNP is normal at 35 with a normal troponin 7.1, I doubt ACS.  D-dimer is negative at 0.46 with a low risk Wells criteria, I doubt pulmonary embolism.  Renal function is normal creatinine 0.7 elevated 16 K, flu and COVID are negative.  Do not think the patient is septic or needs admission for IV antibiotics, she is not hypoxic and is very well-appearing.  I will start her on a azithromycin.  Patient to follow-up with PCP or return for any worsening symptoms.  Discharged improved in no acute distress.    Amount and/or Complexity of Data Reviewed  Labs: ordered.    Risk  Prescription drug management.               ED Course as of 10/07/24 2038   Mon Oct 07, 2024   1630 EKG interpreted by myself as normal sinus rhythm, 77 beats per minute with normal T-waves, normal ST segments, normal intervals, normal EKG. [AS]      ED Course User Index  [AS] Maynor Ibarra MD                           Clinical Impression:  Final diagnoses:  [R07.89] Chest wall pain  [R07.81] Pleuritic chest pain  [J18.9] Pneumonia of left lower lobe due to infectious organism (Primary)          ED Disposition Condition    Discharge Stable          ED Prescriptions       Medication Sig Dispense Start Date End Date Auth. Provider    azithromycin (Z-NITESH) 250 MG tablet Take 1  tablet (250 mg total) by mouth once daily. Take first 2 tablets together, then 1 every day until finished. 6 tablet 10/7/2024 -- Maynor Ibarra MD          Follow-up Information       Follow up With Specialties Details Why Contact Info Additional Information    [facility] - Emergency Dept Emergency Medicine  As needed, If symptoms worsen [address] [facility] [id number]  [phone] 1st floor             Maynor Ibarra MD  10/07/24 2038

## 2024-10-08 LAB
OHS QRS DURATION: 80 MS
OHS QTC CALCULATION: 389 MS